# Patient Record
Sex: FEMALE | Race: WHITE | NOT HISPANIC OR LATINO | Employment: FULL TIME | URBAN - METROPOLITAN AREA
[De-identification: names, ages, dates, MRNs, and addresses within clinical notes are randomized per-mention and may not be internally consistent; named-entity substitution may affect disease eponyms.]

---

## 2017-01-16 ENCOUNTER — TELEPHONE (OUTPATIENT)
Dept: MEDICAL GROUP | Age: 27
End: 2017-01-16

## 2017-01-16 DIAGNOSIS — Z23 NEED FOR VACCINATION: ICD-10-CM

## 2017-01-16 NOTE — TELEPHONE ENCOUNTER
Patient is on the MA Schedule 01/19/2017 for TDAP, HPV, HEP A vaccine/injection.    SPECIFIC Action To Be Taken: Orders pending, please sign.

## 2017-01-19 ENCOUNTER — TELEPHONE (OUTPATIENT)
Dept: MEDICAL GROUP | Age: 27
End: 2017-01-19

## 2017-01-19 ENCOUNTER — NON-PROVIDER VISIT (OUTPATIENT)
Dept: MEDICAL GROUP | Age: 27
End: 2017-01-19
Payer: COMMERCIAL

## 2017-01-19 DIAGNOSIS — F41.9 ANXIETY: ICD-10-CM

## 2017-01-19 DIAGNOSIS — Z23 NEED FOR VACCINATION: ICD-10-CM

## 2017-01-19 PROCEDURE — 90632 HEPA VACCINE ADULT IM: CPT | Performed by: INTERNAL MEDICINE

## 2017-01-19 PROCEDURE — 90715 TDAP VACCINE 7 YRS/> IM: CPT | Performed by: INTERNAL MEDICINE

## 2017-01-19 PROCEDURE — 90651 9VHPV VACCINE 2/3 DOSE IM: CPT | Performed by: INTERNAL MEDICINE

## 2017-01-19 PROCEDURE — 90472 IMMUNIZATION ADMIN EACH ADD: CPT | Performed by: INTERNAL MEDICINE

## 2017-01-19 PROCEDURE — 90471 IMMUNIZATION ADMIN: CPT | Performed by: INTERNAL MEDICINE

## 2017-01-19 NOTE — TELEPHONE ENCOUNTER
1. Caller Name: P                                         Call Back Number: [phone] (home)       Patient approves a detailed voicemail message: no    2. SPECIFIC Action To Be Taken: Referral pending, please sign.    3. Diagnosis/Clinical Reason for Request: Patient would not state    4. Specialty & Provider Name/Lab/Imaging Location: [facility] outside of [facility]    5. Is appointment scheduled for requested order/referral: no    Patient informed they will receive a return phone call from the office ONLY if there are any questions before processing their request. Advised to call back if they haven't received a call from the referral department in 5 days.

## 2017-01-19 NOTE — PROGRESS NOTES
"Williams Singletary is a 26 y.o. female here for a non-provider visit for:   HEPATITIS A 1 of 2    Reason for immunization: continue or complete series started at the office  Immunization records indicate need for vaccine: Yes  Minimum interval has been met for this vaccine: Yes  ABN completed: Not Indicated    VIS Dated  07/20/2016 was given to patient Yes  All IAC Questionnaire questions were answered “No.\"    Patient tolerated injection and no adverse effects were observed or reported Yes.    Pt scheduled for next dose in series: Vandana Singletary is a 26 y.o. female here for a non-provider visit for:   HPV 1 of 3    Reason for immunization: continue or complete series started at the office  Immunization records indicate need for vaccine: Yes  Minimum interval has been met for this vaccine: No  ABN completed: Not Indicated    VIS Dated  12/02/2016 was given to patient Yes  All IAC Questionnaire questions were answered “No.\"    Patient tolerated injection and no adverse effects were observed or reported Yes.    Pt scheduled for next dose in series: Vandana Singletary is a 26 y.o. female here for a non-provider visit for:   TDAP    Reason for immunization: needed for work/school  Immunization records indicate need for vaccine: Yes  Minimum interval has been met for this vaccine: Yes  ABN completed: Not Indicated    VIS Dated  2/24/2015 was given to patient Yes  All IAC Questionnaire questions were answered “No.\"    Patient tolerated injection and no adverse effects were observed or reported Yes.    Pt scheduled for next dose in series: Not Indicated                  "

## 2017-01-19 NOTE — MR AVS SNAPSHOT
Williams Singletary   2017 11:30 AM   Non-Provider Visit   MRN: [id number]    Department:  [address] [place] [address] [facility]   Dept Phone:  [phone]    Description:  Female : 1990   Provider:  RADU COBOS MA           Reason for Visit     Immunizations Hep A, HPV, and Tdap      Allergies as of 2017     No Known Allergies      You were diagnosed with     Need for vaccination   [383178]         Vital Signs     Smoking Status                   Never Smoker            Basic Information     Date Of Birth Sex Race Ethnicity Preferred Language    1990 Female Unknown Unknown English      Problem List              ICD-10-CM Priority Class Noted - Resolved    Atypical chest pain R07.89   2016 - Present    Sensation of fullness in both ears H93.8X3   2016 - Present    Seasonal allergies J30.2   2016 - Present    Decreased hearing of both ears H91.93   2016 - Present    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   2016 - Present      Health Maintenance        Date Due Completion Dates    IMM HEP A VACCINE (1 of 2 - Standard Series) 1992 ---    IMM HPV VACCINE (1 of 3 - Female 3 Dose Series) 2002 ---    IMM VARICELLA (CHICKENPOX) VACCINE (1 of 2 - 2 Dose Adolescent Series) 2004 ---    IMM DTaP/Tdap/Td Vaccine (7 - Td) 2016, 1996, 1993, 1991, 1991, 1991    PAP SMEAR 2019            Current Immunizations     Dtap Vaccine 1996, 1993, 1991, 1991, 1991    HIB Vaccine(PEDVAX) 1993, 1992, 1991, 1991    HPV 9-VALENT VACCINE (GARDASIL 9) 2017 11:47 AM    Hepatitis A Vaccine, Adult 2017 11:44 AM    Hepatitis B Vaccine Non-Recombivax (Ped/Adol) 2004, 2004, 10/16/2003    Influenza TIV (IM) 2016    Influenza Vaccine Quad Inj (Pf) 2016  7:52 AM    Influenza Vaccine Quad Inj (Preserved) 2016    MMR Vaccine 1997, 1993    Meningococcal Conjugate Vaccine MCV4 (Menveo)  8/9/2006    OPV - Historical Data 6/1/1993, 1991, 1991, 1991    QF GOLD 1/20/2016    QUANTIFERON GOLD 1/20/2016    Tdap Vaccine 1/19/2017 11:46 AM, 8/9/2006      Below and/or attached are the medications your provider expects you to take. Review all of your home medications and newly ordered medications with your provider and/or pharmacist. Follow medication instructions as directed by your provider and/or pharmacist. Please keep your medication list with you and share with your provider. Update the information when medications are discontinued, doses are changed, or new medications (including over-the-counter products) are added; and carry medication information at all times in the event of emergency situations     Allergies:  No Known Allergies          Medications  Valid as of: January 19, 2017 -  1:22 PM    Generic Name Brand Name Tablet Size Instructions for use    Acyclovir (Cream) ZOVIRAX 5 %         Aluminum Chloride (Crystals) Aluminum Chloride Hexahydrate  Please provide Aluminum chloride deodorant 1 bottle. Apply on affected area once a day to twice a day as needed.        Fluticasone Propionate (Suspension) FLONASE 50 MCG/ACT Spray 2 Sprays in nose every day.        Hydrocortisone (Cream) hydrocortisone 2.5 % Apply once or twice a day as needed on affected areas.        Polyethyl Glycol-Propyl Glycol   by Ophthalmic route 2 times a day as needed.        .                 Medicines prescribed today were sent to:     [facility] #[id number] - [place] [address]    Phone: [phone] Fax: [phone]    Open 24 Hours?: No      Medication refill instructions:       If your prescription bottle indicates you have medication refills left, it is not necessary to call your provider’s office. Please contact your pharmacy and they will refill your medication.    If your prescription bottle indicates you do not have any refills left, you may request refills at  any time through one of the following ways: The online Escom system (except Urgent Care), by calling your provider’s office, or by asking your pharmacy to contact your provider’s office with a refill request. Medication refills are processed only during regular business hours and may not be available until the next business day. Your provider may request additional information or to have a follow-up visit with you prior to refilling your medication.   *Please Note: Medication refills are assigned a new Rx number when refilled electronically. Your pharmacy may indicate that no refills were authorized even though a new prescription for the same medication is available at the pharmacy. Please request the medicine by name with the pharmacy before contacting your provider for a refill.           Escom Access Code: Activation code not generated  Current Escom Status: Active

## 2017-05-03 ENCOUNTER — TELEPHONE (OUTPATIENT)
Dept: MEDICAL GROUP | Age: 27
End: 2017-05-03

## 2017-05-03 NOTE — TELEPHONE ENCOUNTER
ESTABLISHED PATIENT PRE-VISIT PLANNING     Note: Patient will not be contacted if there is no indication to call.     1.  Reviewed note from last office visit with PCP and/or other med group provider: Yes    2.  If any orders were placed at last visit, do we have Results/Consult Notes?        •  Labs - Labs were not ordered at last office visit.       •  Imaging - Imaging was not ordered at last office visit.       •  Referrals - No referrals were ordered at last office visit.    3.  Immunizations were updated in Epic using WebIZ?: Epic matches WebIZ       •  Web Iz Recommendations: HPV    4.  Patient is due for the following Health Maintenance Topics:   Health Maintenance Due   Topic Date Due   • IMM VARICELLA (CHICKENPOX) VACCINE (1 of 2 - 2 Dose Adolescent Series) 04/22/2004   • IMM HPV VACCINE (2 of 3 - Female 3 Dose Series) 03/16/2017       - Patient is up-to-date on all Health Maintenance topics. No records have been requested at this time.    5.  Patient was not informed to arrive 15 min prior to their scheduled appointment and bring in their medication bottles.

## 2017-05-04 ENCOUNTER — OFFICE VISIT (OUTPATIENT)
Dept: MEDICAL GROUP | Age: 27
End: 2017-05-04
Payer: COMMERCIAL

## 2017-05-04 VITALS
HEART RATE: 95 BPM | WEIGHT: 163 LBS | OXYGEN SATURATION: 98 % | TEMPERATURE: 98.1 F | RESPIRATION RATE: 16 BRPM | BODY MASS INDEX: 28.88 KG/M2 | SYSTOLIC BLOOD PRESSURE: 122 MMHG | HEIGHT: 63 IN | DIASTOLIC BLOOD PRESSURE: 72 MMHG

## 2017-05-04 DIAGNOSIS — J00 ACUTE NASOPHARYNGITIS: ICD-10-CM

## 2017-05-04 PROCEDURE — 99214 OFFICE O/P EST MOD 30 MIN: CPT | Performed by: INTERNAL MEDICINE

## 2017-05-04 RX ORDER — FLUTICASONE PROPIONATE 50 MCG
2 SPRAY, SUSPENSION (ML) NASAL DAILY
Qty: 16 G | Refills: 3 | Status: SHIPPED | OUTPATIENT
Start: 2017-05-04 | End: 2017-11-02

## 2017-05-04 RX ORDER — AZITHROMYCIN 250 MG/1
TABLET, FILM COATED ORAL
Qty: 6 TAB | Refills: 0 | Status: SHIPPED | OUTPATIENT
Start: 2017-05-04 | End: 2017-10-12

## 2017-05-04 ASSESSMENT — PATIENT HEALTH QUESTIONNAIRE - PHQ9: CLINICAL INTERPRETATION OF PHQ2 SCORE: 0

## 2017-05-04 ASSESSMENT — PAIN SCALES - GENERAL: PAINLEVEL: NO PAIN

## 2017-05-04 NOTE — MR AVS SNAPSHOT
"Williams Singletary   2017 1:00 PM   Office Visit   MRN: [id number]    Department:  [address] [place] [address]   Dept Phone:  [phone]    Description:  Female : 1990   Provider:  Nancy Adhikari M.D.           Reason for Visit     Sinusitis Dx 1 month ago, still having symptoms      Allergies as of 2017     No Known Allergies      You were diagnosed with     Acute nasopharyngitis   [[id number]]         Vital Signs     Blood Pressure Pulse Temperature Respirations Height Weight    122/72 mmHg 95 36.7 °C (98.1 °F) 16 1.608 m (5' 3.31\") 73.936 kg (163 lb)    Body Mass Index Oxygen Saturation Last Menstrual Period Breastfeeding? 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   2016 - Present    Acute nasopharyngitis J00   2017 - Present      Health Maintenance        Date Due Completion Dates    IMM VARICELLA (CHICKENPOX) VACCINE (1 of 2 - 2 Dose Adolescent Series) 2004 ---    IMM HPV VACCINE (2 of 3 - Female 3 Dose Series) 3/16/2017 2017    IMM HEP A VACCINE (2 of 2 - Standard Series) 2017    PAP SMEAR 2019    IMM DTaP/Tdap/Td Vaccine (8 - Td) 2027, 2006, 1996, 1993, 1991, 1991, 1991            Current Immunizations     Dtap Vaccine 1996, 1993, 1991, 1991, 1991    HIB Vaccine(PEDVAX) 1993, 1992, 1991, 1991    HPV 9-VALENT VACCINE (GARDASIL 9) 2017 11:47 AM    Hepatitis A Vaccine, Adult 2017 11:44 " AM    Hepatitis B Vaccine Non-Recombivax (Ped/Adol) 4/21/2004, 1/20/2004, 10/16/2003    Influenza TIV (IM) 1/20/2016    Influenza Vaccine Quad Inj (Pf) 11/8/2016  7:52 AM    Influenza Vaccine Quad Inj (Preserved) 1/20/2016    MMR Vaccine 4/8/1997, 6/1/1993    Meningococcal Conjugate Vaccine MCV4 (Menveo) 8/9/2006    OPV - Historical Data 8/26/1996, 6/1/1993, 1991, 1991, 1991    QF GOLD 1/20/2016    QUANTIFERON GOLD 1/20/2016    Tdap Vaccine 1/19/2017 11:46 AM, 8/9/2006      Below and/or attached are the medications your provider expects you to take. Review all of your home medications and newly ordered medications with your provider and/or pharmacist. Follow medication instructions as directed by your provider and/or pharmacist. Please keep your medication list with you and share with your provider. Update the information when medications are discontinued, doses are changed, or new medications (including over-the-counter products) are added; and carry medication information at all times in the event of emergency situations     Allergies:  No Known Allergies          Medications  Valid as of: May 04, 2017 -  4:30 PM    Generic Name Brand Name Tablet Size Instructions for use    Acyclovir (Cream) ZOVIRAX 5 %         Aluminum Chloride (Crystals) Aluminum Chloride Hexahydrate  Please provide Aluminum chloride deodorant 1 bottle. Apply on affected area once a day to twice a day as needed.        Azithromycin (Tab) ZITHROMAX 250 MG Take 2 tabs on first day and then take 1 tab daily for another 4 days.        Fluticasone Propionate (Suspension) FLONASE 50 MCG/ACT Spray 2 Sprays in nose every day.        Hydrocortisone (Cream) hydrocortisone 2.5 % Apply once or twice a day as needed on affected areas.        Polyethyl Glycol-Propyl Glycol   by Ophthalmic route 2 times a day as needed.        .                 Medicines prescribed today were sent to:     [facility][id number] - [place], [place] - [address]     [place] [address]    Phone: [phone] Fax: [phone]    Open 24 Hours?: No      Medication refill instructions:       If your prescription bottle indicates you have medication refills left, it is not necessary to call your provider’s office. Please contact your pharmacy and they will refill your medication.    If your prescription bottle indicates you do not have any refills left, you may request refills at any time through one of the following ways: The online 404 Found! system (except Urgent Care), by calling your provider’s office, or by asking your pharmacy to contact your provider’s office with a refill request. Medication refills are processed only during regular business hours and may not be available until the next business day. Your provider may request additional information or to have a follow-up visit with you prior to refilling your medication.   *Please Note: Medication refills are assigned a new Rx number when refilled electronically. Your pharmacy may indicate that no refills were authorized even though a new prescription for the same medication is available at the pharmacy. Please request the medicine by name with the pharmacy before contacting your provider for a refill.           404 Found! Access Code: Activation code not generated  Current 404 Found! Status: Active

## 2017-05-04 NOTE — ASSESSMENT & PLAN NOTE
This is a new problem to me. Patient reported that she has runny nose, postnasal drip, cough of yellowish phlegm for 3-4 weeks. She tried over-the-counter Mucinex without significant improvement. She reported fullness on the ears but no discharge or pain. She has [place] pot at home. She has not tried it yet. She reported subjective feverish at home, but she did not measure her temperature. She is afebrile in the clinic today. She had flu shot in November 2016.

## 2017-05-04 NOTE — PROGRESS NOTES
"Subjective:   Williams Singletary is a 27 y.o. female here today for evaluation and management of:      Acute nasopharyngitis  This is a new problem to me. Patient reported that she has runny nose, postnasal drip, cough of yellowish phlegm for 3-4 weeks. She tried over-the-counter Mucinex without significant improvement. She reported fullness on the ears but no discharge or pain. She has Sera pot at home. She has not tried it yet. She reported subjective feverish at home, but she did not measure her temperature. She is afebrile in the clinic today. She had flu shot in November 2016.         Current medicines (including changes today)  Current Outpatient Prescriptions   Medication Sig Dispense Refill   • azithromycin (ZITHROMAX) 250 MG Tab Take 2 tabs on first day and then take 1 tab daily for another 4 days. 6 Tab 0   • fluticasone (FLONASE) 50 MCG/ACT nasal spray Spray 2 Sprays in nose every day. 16 g 3   • hydrocortisone 2.5 % Cream topical cream Apply once or twice a day as needed on affected areas. 1 Tube 1   • Aluminum Chloride Hexahydrate Crystals Please provide Aluminum chloride deodorant 1 bottle. Apply on affected area once a day to twice a day as needed. 1 Bottle 0   • Polyethyl Glycol-Propyl Glycol (SYSTANE ULTRA OP) by Ophthalmic route 2 times a day as needed.     • ZOVIRAX 5 % Cream        No current facility-administered medications for this visit.     She  has a past medical history of H/O bladder infections.    ROS   No chest pain, no shortness of breath, no abdominal pain       Objective:     Blood pressure 122/72, pulse 95, temperature 36.7 °C (98.1 °F), resp. rate 16, height 1.608 m (5' 3.31\"), weight 73.936 kg (163 lb), last menstrual period 04/17/2017, SpO2 98 %, not currently breastfeeding. Body mass index is 28.59 kg/(m^2).   Physical Exam:  General: Alert, oriented and no acute distress.  Eye contact is good, speech goal directed, affect calm  HEENT: conjunctiva non-injected, sclera non-icteric. "   Oral mucous membranes pink and moist with no lesions.  Pinna normal. TM pearly gray. Mild serous effusion on left ear, mild erythema on right ear canal.  Neck No supraclavicular, submandibular, submental lymphadenopathy or masses in the neck or supraclavicular regions.  Lungs: Normal respiratory effort, clear to auscultation bilaterally with good excursion.  CV: regular rate and rhythm. No murmurs.   Abdomen: soft, non distended, nontender, Bowel sound normal.  Ext: no edema, color normal, vascularity normal, temperature normal        Assessment and Plan:   The following treatment plan was discussed     1. Acute nasopharyngitis  - Prescribed Z-Pernell and reviewed. They will use and side effect of Z-Pernell with patient. Advised to take probiotic with Z-Pernell.  - Discussed at length to rinse sinuses with over the counter nasal wash or Netti pot once or twice a day and then use flonase nasal spray once or twice a day after rinsing flonase nasal spray.  - Advised to try nasal steam therapy. Showed picture and discussed with patient how to do nasal steam therapy.   - Advised to gargle her throat with warm salt water.    - Recommend to increase water intake.  - azithromycin (ZITHROMAX) 250 MG Tab; Take 2 tabs on first day and then take 1 tab daily for another 4 days.  Dispense: 6 Tab; Refill: 0  - fluticasone (FLONASE) 50 MCG/ACT nasal spray; Spray 2 Sprays in nose every day.  Dispense: 16 g; Refill: 3      Followup: Return if symptoms worsen or fail to improve.      Please note that this dictation was created using voice recognition software. I have made every reasonable attempt to correct obvious errors, but I expect that there may have unintended errors in text, spelling, punctuation, or grammar that I did not discover.

## 2017-07-27 ENCOUNTER — HOSPITAL ENCOUNTER (OUTPATIENT)
Dept: LAB | Facility: MEDICAL CENTER | Age: 27
End: 2017-07-27
Payer: COMMERCIAL

## 2017-07-27 LAB
BDY FAT % MEASURED: 26.5 %
BP DIAS: 63 MMHG
BP SYS: 111 MMHG
CHOLEST SERPL-MCNC: 148 MG/DL (ref 100–199)
DIABETES HTDIA: NO
EVENT NAME HTEVT: NORMAL
FASTING HTFAS: YES
GLUCOSE SERPL-MCNC: 82 MG/DL (ref 65–99)
HDLC SERPL-MCNC: 56 MG/DL
HYPERTENSION HTHYP: NO
LDLC SERPL CALC-MCNC: 80 MG/DL
SCREENING LOC CITY HTCIT: NORMAL
SCREENING LOC STATE HTSTA: NORMAL
SCREENING LOCATION HTLOC: NORMAL
SMOKING HTSMO: NO
SUBSCRIBER ID HTSID: NORMAL
TRIGL SERPL-MCNC: 58 MG/DL (ref 0–149)

## 2017-07-27 PROCEDURE — 36415 COLL VENOUS BLD VENIPUNCTURE: CPT

## 2017-07-27 PROCEDURE — S5190 WELLNESS ASSESSMENT BY NONPH: HCPCS

## 2017-07-27 PROCEDURE — 82947 ASSAY GLUCOSE BLOOD QUANT: CPT

## 2017-07-27 PROCEDURE — 80061 LIPID PANEL: CPT

## 2017-09-12 ENCOUNTER — TELEPHONE (OUTPATIENT)
Dept: URGENT CARE | Facility: CLINIC | Age: 27
End: 2017-09-12

## 2017-09-15 ENCOUNTER — EH NON-PROVIDER (OUTPATIENT)
Dept: OCCUPATIONAL MEDICINE | Facility: CLINIC | Age: 27
End: 2017-09-15

## 2017-09-15 DIAGNOSIS — Z29.89 NEED FOR ISOLATION: ICD-10-CM

## 2017-09-15 PROCEDURE — 94375 RESPIRATORY FLOW VOLUME LOOP: CPT

## 2017-10-04 ENCOUNTER — IMMUNIZATION (OUTPATIENT)
Dept: OCCUPATIONAL MEDICINE | Facility: CLINIC | Age: 27
End: 2017-10-04

## 2017-10-04 DIAGNOSIS — Z23 NEED FOR VACCINATION: ICD-10-CM

## 2017-10-04 PROCEDURE — 90686 IIV4 VACC NO PRSV 0.5 ML IM: CPT | Performed by: PREVENTIVE MEDICINE

## 2017-10-11 ENCOUNTER — TELEPHONE (OUTPATIENT)
Dept: MEDICAL GROUP | Age: 27
End: 2017-10-11

## 2017-10-11 NOTE — TELEPHONE ENCOUNTER
ESTABLISHED PATIENT PRE-VISIT PLANNING     Note: Patient will not be contacted if there is no indication to call.     1.  Reviewed notes from the last few office visits within the medical group: Yes    2.  If any orders were placed at last visit or intended to be done for this visit (i.e. 6 mos follow-up), do we have Results/Consult Notes?        •  Labs - Labs were not ordered at last office visit.   Note: If patient appointment is for lab review and patient did not complete labs,                check with provider if OK to reschedule patient until labs completed.       •  Imaging - Imaging was not ordered at last office visit.       •  Referrals - Referral ordered, patient has NOT been seen.    3. Is this appointment scheduled as a Hospital Follow-Up? No    4.  Immunizations were updated in Epic using WebIZ?: Epic matches WebIZ       •  Web Iz Recommendations: HEPATITIS A , HPV and VARICELLA (Chicken Pox)     5.  Patient is due for the following Health Maintenance Topics:   Health Maintenance Due   Topic Date Due   • IMM VARICELLA (CHICKENPOX) VACCINE (1 of 2 - 2 Dose Adolescent Series) 04/22/2004   • IMM HPV VACCINE (2 of 3 - Female 3 Dose Series) 03/16/2017   • IMM HEP A VACCINE (2 of 2 - Standard Series) 07/19/2017       - Patient is up-to-date on all Health Maintenance topics. No records have been requested at this time.    6.  Patient was NOT informed to arrive 15 min prior to their scheduled appointment and bring in their medication bottles.

## 2017-10-12 ENCOUNTER — OFFICE VISIT (OUTPATIENT)
Dept: MEDICAL GROUP | Age: 27
End: 2017-10-12
Payer: COMMERCIAL

## 2017-10-12 ENCOUNTER — HOSPITAL ENCOUNTER (OUTPATIENT)
Facility: MEDICAL CENTER | Age: 27
End: 2017-10-12
Attending: FAMILY MEDICINE
Payer: COMMERCIAL

## 2017-10-12 VITALS
TEMPERATURE: 98.3 F | HEIGHT: 65 IN | BODY MASS INDEX: 27.76 KG/M2 | HEART RATE: 83 BPM | OXYGEN SATURATION: 99 % | WEIGHT: 166.6 LBS | SYSTOLIC BLOOD PRESSURE: 118 MMHG | DIASTOLIC BLOOD PRESSURE: 70 MMHG

## 2017-10-12 DIAGNOSIS — Z23 NEED FOR HPV VACCINATION: ICD-10-CM

## 2017-10-12 DIAGNOSIS — M54.17 LUMBOSACRAL RADICULOPATHY: ICD-10-CM

## 2017-10-12 DIAGNOSIS — N30.00 ACUTE CYSTITIS WITHOUT HEMATURIA: ICD-10-CM

## 2017-10-12 PROCEDURE — 81001 URINALYSIS AUTO W/SCOPE: CPT

## 2017-10-12 PROCEDURE — 87077 CULTURE AEROBIC IDENTIFY: CPT

## 2017-10-12 PROCEDURE — 87086 URINE CULTURE/COLONY COUNT: CPT

## 2017-10-12 PROCEDURE — 90471 IMMUNIZATION ADMIN: CPT | Performed by: FAMILY MEDICINE

## 2017-10-12 PROCEDURE — 99214 OFFICE O/P EST MOD 30 MIN: CPT | Mod: 25 | Performed by: FAMILY MEDICINE

## 2017-10-12 PROCEDURE — 99000 SPECIMEN HANDLING OFFICE-LAB: CPT | Performed by: FAMILY MEDICINE

## 2017-10-12 PROCEDURE — 87186 SC STD MICRODIL/AGAR DIL: CPT

## 2017-10-12 PROCEDURE — 90651 9VHPV VACCINE 2/3 DOSE IM: CPT | Performed by: FAMILY MEDICINE

## 2017-10-12 RX ORDER — NITROFURANTOIN 25; 75 MG/1; MG/1
100 CAPSULE ORAL EVERY 12 HOURS
Qty: 10 CAP | Refills: 0 | Status: SHIPPED | OUTPATIENT
Start: 2017-10-12 | End: 2017-10-17

## 2017-10-12 RX ORDER — NAPROXEN 500 MG/1
500 TABLET ORAL 2 TIMES DAILY WITH MEALS
Qty: 30 TAB | Refills: 0 | Status: SHIPPED | OUTPATIENT
Start: 2017-10-12 | End: 2017-11-02

## 2017-10-12 RX ORDER — CYCLOBENZAPRINE HCL 10 MG
10 TABLET ORAL 3 TIMES DAILY PRN
Qty: 30 TAB | Refills: 0 | Status: SHIPPED | OUTPATIENT
Start: 2017-10-12 | End: 2017-11-02

## 2017-10-13 LAB
APPEARANCE UR: ABNORMAL
BACTERIA #/AREA URNS HPF: ABNORMAL /HPF
BILIRUB UR QL STRIP.AUTO: NEGATIVE
COLOR UR: ABNORMAL
CULTURE IF INDICATED INDCX: YES UA CULTURE
EPI CELLS #/AREA URNS HPF: ABNORMAL /HPF
GLUCOSE UR STRIP.AUTO-MCNC: NEGATIVE MG/DL
KETONES UR STRIP.AUTO-MCNC: ABNORMAL MG/DL
LEUKOCYTE ESTERASE UR QL STRIP.AUTO: ABNORMAL
MICRO URNS: ABNORMAL
NITRITE UR QL STRIP.AUTO: NEGATIVE
PH UR STRIP.AUTO: 5.5 [PH]
PROT UR QL STRIP: NEGATIVE MG/DL
RBC # URNS HPF: ABNORMAL /HPF
RBC UR QL AUTO: NEGATIVE
SP GR UR STRIP.AUTO: 1.02
UROBILINOGEN UR STRIP.AUTO-MCNC: 1 MG/DL
WBC #/AREA URNS HPF: ABNORMAL /HPF

## 2017-10-13 NOTE — PROGRESS NOTES
Subjective:   CC: acute back pain    HPI:     Williams Singletary is a 27 y.o. female, established patient of the clinic, presents with the following concerns:     1. Lumbosacral radiculopathy  Pt states that she suffers acute lumbosacral back pain after sleeping on her parent's love seat couple days ago. Pain is sharp and spatic, mostly localizes to the right lumbosacral area and radiate to the posterior and lateral right thigh. Pain is worse with direct pressure and with truncal flexing and bending, better with rest/heat/and topical analgesic cream. Yesterday, she was trying to catch an object and suffers worsening pain. She denies fever, chills, urinary/bowel symptoms, denies LE weakness.     2. Acute cystitis without hematuria  Pt c/o intermittent dysuria over the past few days, symptoms improving temporarily with AZO and hydration but keeps coming back. Associated symptoms include urinary frequency and urgency.   She denies fever, chills, flank pain, hematuria, pelvic pain/pressure. She is not on her period. She is sexually active, negative hx of STD.     Current medicines (including changes today)  Current Outpatient Prescriptions   Medication Sig Dispense Refill   • nitrofurantoin monohydr macro (MACROBID) 100 MG Cap Take 1 Cap by mouth every 12 hours for 5 days. 10 Cap 0   • cyclobenzaprine (FLEXERIL) 10 MG Tab Take 1 Tab by mouth 3 times a day as needed. 30 Tab 0   • naproxen (NAPROSYN) 500 MG Tab Take 1 Tab by mouth 2 times a day, with meals. 30 Tab 0   • fluticasone (FLONASE) 50 MCG/ACT nasal spray Spray 2 Sprays in nose every day. 16 g 3   • hydrocortisone 2.5 % Cream topical cream Apply once or twice a day as needed on affected areas. 1 Tube 1   • Aluminum Chloride Hexahydrate Crystals Please provide Aluminum chloride deodorant 1 bottle. Apply on affected area once a day to twice a day as needed. 1 Bottle 0   • Polyethyl Glycol-Propyl Glycol (SYSTANE ULTRA OP) by Ophthalmic route 2 times a day as needed.     •  "ZOVIRAX 5 % Cream        No current facility-administered medications for this visit.      She  has a past medical history of H/O bladder infections.    I personally reviewed patient's problem list, allergies, medications, family hx, social hx with patient and update EPIC.     REVIEW OF SYSTEMS:  CONSTITUTIONAL:  Denies night sweats, fatigue, malaise, lethargy, fever or chills.  RESPIRATORY:  Denies cough, wheeze, hemoptysis, or shortness of breath.  CARDIOVASCULAR:  Denies chest pains, palpitations, pedal edema     Objective:     Blood pressure 118/70, pulse 83, temperature 36.8 °C (98.3 °F), height 1.651 m (5' 5\"), weight 75.6 kg (166 lb 9.6 oz), SpO2 99 %. Body mass index is 27.72 kg/m².    Physical Exam:  Constitutional: awake, alert, in no distress.  Skin: Warm, dry, good turgor, no rashes, bruises, ulcers in visible areas.  Eye: conjunctiva clear, lids neg for edema or lesions.  Respiratory: Unlabored respiratory effort, lungs clear to auscultation, no wheezes, no rhonchi.  Cardiovascular: Normal S1, S2, no murmur, no pedal edema.  Abdomen: Soft, non-tender to palpation, no hernia, no hepatosplenomegaly, negative flank pain to percussion.   Neuro: CN2-12 grossly intact.    Psych: Oriented x3, affect and mood wnl, intact judgement and insight.   MSK: no visible deformity of the spine, lumbosacral ROM mildly limited due to pain, right lumbosacral paraspinal muscles are moderately tender to palpation, positive straight leg raise bilaterally. Strength 5/5, reflexes 2/4.     Assessment and Plan:   The following treatment plan was discussed    1. Lumbosacral radiculopathy  Hx and exam concerning for acute lumbosacral radiculopathy, no red flags. Plan:   - cyclobenzaprine (FLEXERIL) 10 MG Tab; Take 1 Tab by mouth 3 times a day as needed.  Dispense: 30 Tab; Refill: 0  - naproxen (NAPROSYN) 500 MG Tab; Take 1 Tab by mouth 2 times a day, with meals.  Dispense: 30 Tab; Refill: 0  - home rehab exercise  - f/u with PCP " PRN.     2. Acute cystitis without hematuria  Hx concerning for acute UTI. Pt is not reliable as pt is taking Azo. Plan:   - Urinalysis, Culture if Indicated; Future  - nitrofurantoin monohydr macro (MACROBID) 100 MG Cap; Take 1 Cap by mouth every 12 hours for 5 days.  Dispense: 10 Cap; Refill: 0  - hydration, cranberry juice  - f/u with PCP PRN.     3. Need for HPV vaccination  - Gardasil 9      Ly RODNEY Ash M.D.      Followup: Return for As needed.    Please note that this dictation was created using voice recognition software. I have made every reasonable attempt to correct obvious errors, but I expect that there are errors of grammar and possibly content that I did not discover before finalizing the note.

## 2017-10-15 LAB
BACTERIA UR CULT: ABNORMAL
SIGNIFICANT IND 70042: ABNORMAL
SOURCE SOURCE: ABNORMAL

## 2017-11-02 ENCOUNTER — OFFICE VISIT (OUTPATIENT)
Dept: MEDICAL GROUP | Age: 27
End: 2017-11-02
Payer: COMMERCIAL

## 2017-11-02 ENCOUNTER — HOSPITAL ENCOUNTER (OUTPATIENT)
Dept: RADIOLOGY | Facility: MEDICAL CENTER | Age: 27
End: 2017-11-02
Attending: INTERNAL MEDICINE
Payer: COMMERCIAL

## 2017-11-02 VITALS
RESPIRATION RATE: 16 BRPM | DIASTOLIC BLOOD PRESSURE: 82 MMHG | HEART RATE: 64 BPM | WEIGHT: 154 LBS | SYSTOLIC BLOOD PRESSURE: 134 MMHG | HEIGHT: 65 IN | BODY MASS INDEX: 25.66 KG/M2 | OXYGEN SATURATION: 99 % | TEMPERATURE: 97.3 F

## 2017-11-02 DIAGNOSIS — R11.0 NAUSEA: ICD-10-CM

## 2017-11-02 DIAGNOSIS — R05.9 COUGH: Primary | ICD-10-CM

## 2017-11-02 DIAGNOSIS — J18.9 COMMUNITY ACQUIRED PNEUMONIA OF LEFT LOWER LOBE OF LUNG: ICD-10-CM

## 2017-11-02 DIAGNOSIS — R05.9 COUGH: ICD-10-CM

## 2017-11-02 DIAGNOSIS — J20.9 ACUTE BRONCHITIS, UNSPECIFIED ORGANISM: ICD-10-CM

## 2017-11-02 PROBLEM — J00 ACUTE NASOPHARYNGITIS: Status: RESOLVED | Noted: 2017-05-04 | Resolved: 2017-11-02

## 2017-11-02 LAB
FLUAV+FLUBV AG SPEC QL IA: NORMAL
INT CON NEG: NORMAL
INT CON POS: NORMAL

## 2017-11-02 PROCEDURE — 87804 INFLUENZA ASSAY W/OPTIC: CPT | Performed by: INTERNAL MEDICINE

## 2017-11-02 PROCEDURE — 71020 DX-CHEST-2 VIEWS: CPT

## 2017-11-02 PROCEDURE — 99214 OFFICE O/P EST MOD 30 MIN: CPT | Performed by: INTERNAL MEDICINE

## 2017-11-02 RX ORDER — AMOXICILLIN AND CLAVULANATE POTASSIUM 875; 125 MG/1; MG/1
1 TABLET, FILM COATED ORAL 2 TIMES DAILY
Qty: 30 TAB | Refills: 1 | Status: SHIPPED | OUTPATIENT
Start: 2017-11-02 | End: 2017-11-06

## 2017-11-02 RX ORDER — PROMETHAZINE HYDROCHLORIDE 25 MG/1
25 TABLET ORAL EVERY 6 HOURS PRN
Qty: 20 TAB | Refills: 0 | Status: SHIPPED | OUTPATIENT
Start: 2017-11-02 | End: 2017-12-14

## 2017-11-02 RX ORDER — CEFTRIAXONE 1 G/1
1 INJECTION, POWDER, FOR SOLUTION INTRAMUSCULAR; INTRAVENOUS ONCE
Status: COMPLETED | OUTPATIENT
Start: 2017-11-03 | End: 2017-11-03

## 2017-11-02 ASSESSMENT — ENCOUNTER SYMPTOMS
GASTROINTESTINAL NEGATIVE: 1
CARDIOVASCULAR NEGATIVE: 1
CONSTITUTIONAL NEGATIVE: 1
EYES NEGATIVE: 1
NEUROLOGICAL NEGATIVE: 1
RESPIRATORY NEGATIVE: 1
PSYCHIATRIC NEGATIVE: 1
MUSCULOSKELETAL NEGATIVE: 1

## 2017-11-02 NOTE — PROGRESS NOTES
"Subjective:      Williams Singletary is a 27 y.o. female who presents with Cough (1 week )  The patient has a one-week history of cough productive of yellowish sputum with low-grade fever and myalgias and chills and low-grade headache. No sore throat. No abdominal pain diarrhea or constipation but she does have some mild nausea for the last few days.      Medications none     allergies none                  HPI    Review of Systems   Constitutional: Negative.    HENT: Negative.    Eyes: Negative.    Respiratory: Negative.    Cardiovascular: Negative.    Gastrointestinal: Negative.    Genitourinary: Negative.    Musculoskeletal: Negative.    Skin: Negative.    Neurological: Negative.    Endo/Heme/Allergies: Negative.    Psychiatric/Behavioral: Negative.           Objective:     /82   Pulse 64   Temp 36.3 °C (97.3 °F)   Resp 16   Ht 1.651 m (5' 5\")   Wt 69.9 kg (154 lb)   LMP 10/19/2017   SpO2 99%   Breastfeeding? No   BMI 25.63 kg/m²      Physical Exam   Constitutional: She is oriented to person, place, and time. She appears well-developed and well-nourished. No distress.   HENT:   Head: Normocephalic and atraumatic.   Right Ear: External ear normal.   Left Ear: External ear normal.   Nose: Nose normal.   Mouth/Throat: Oropharynx is clear and moist. No oropharyngeal exudate.   Eyes: Conjunctivae and EOM are normal. Pupils are equal, round, and reactive to light. Right eye exhibits no discharge. Left eye exhibits no discharge. No scleral icterus.   Neck: Normal range of motion. Neck supple. No JVD present. No tracheal deviation present. No thyromegaly present.   Cardiovascular: Normal rate, regular rhythm, normal heart sounds and intact distal pulses.  Exam reveals no gallop and no friction rub.    No murmur heard.  Pulmonary/Chest: Effort normal and breath sounds normal. No stridor. No respiratory distress. She has no wheezes. She has no rales. She exhibits no tenderness.   Abdominal: Soft. Bowel sounds are " normal. She exhibits no distension and no mass. There is no tenderness. There is no rebound and no guarding.   Musculoskeletal: Normal range of motion. She exhibits no edema or tenderness.   Lymphadenopathy:     She has no cervical adenopathy.   Neurological: She is alert and oriented to person, place, and time. She has normal reflexes. She displays normal reflexes. No cranial nerve deficit. She exhibits normal muscle tone. Coordination normal.   Skin: Skin is warm and dry. No rash noted. She is not diaphoretic. No erythema. No pallor.   Psychiatric: She has a normal mood and affect. Her behavior is normal. Judgment and thought content normal.   Vitals reviewed.    Hospital Outpatient Visit on 10/12/2017   Component Date Value   • Micro Urine Req 10/13/2017 Microscopic    • Color 10/13/2017 DK Yellow    • Character 10/13/2017 Cloudy*   • Specific Gravity 10/13/2017 1.024    • Ph 10/13/2017 5.5    • Glucose 10/13/2017 Negative    • Ketones 10/13/2017 Trace*   • Protein 10/13/2017 Negative    • Bilirubin 10/13/2017 Negative    • Urobilinogen, Urine 10/13/2017 1.0    • Nitrite 10/13/2017 Negative    • Leukocyte Esterase 10/13/2017 Trace*   • Occult Blood 10/13/2017 Negative    • Culture Indicated 10/13/2017 Yes    • WBC 10/13/2017 2-5    • RBC 10/13/2017 0-2    • Bacteria 10/13/2017 Many*   • Epithelial Cells 10/13/2017 Few    • Significant Indicator 10/15/2017 POS    • Source 10/15/2017 UR    • Urine Culture 10/15/2017 *                    Value:Klebsiella pneumoniae  >100,000 cfu/mL        No results found for: HBA1C  Lab Results   Component Value Date/Time    SODIUM 136 08/18/2016 09:38 AM    POTASSIUM 3.8 08/18/2016 09:38 AM    CHLORIDE 108 08/18/2016 09:38 AM    CO2 22 08/18/2016 09:38 AM    GLUCOSE 82 07/27/2017 12:57 PM    BUN 9 08/18/2016 09:38 AM    CREATININE 0.64 08/18/2016 09:38 AM    ALKPHOSPHAT 105 (H) 08/18/2016 09:38 AM    ASTSGOT 19 08/18/2016 09:38 AM    ALTSGPT 15 08/18/2016 09:38 AM    TBILIRUBIN  0.9 08/18/2016 09:38 AM     No results found for: INR  Lab Results   Component Value Date/Time    CHOLSTRLTOT 148 07/27/2017 12:57 PM    LDL 80 07/27/2017 12:57 PM    HDL 56 07/27/2017 12:57 PM    TRIGLYCERIDE 58 07/27/2017 12:57 PM       No results found for: TESTOSTERONE  No results found for: TSH  No results found for: FREET4  No results found for: URICACID  No components found for: VITB12  No results found for: 25HYDROXY            Assessment/Plan:     1. Cough-Probably secondary to an acute bronchitis. Check chest x-ray and start Augmentin      - DX-CHEST-2 VIEWS; Future-pending, call result  - POCT Influenza A/B-negative  - promethazine (PHENERGAN) 25 MG Tab; Take 1 Tab by mouth every 6 hours as needed for Nausea/Vomiting.  Dispense: 20 Tab; Refill: 0    2. Acute bronchitis, unspecified organism  Start Augmentin.- amoxicillin-clavulanate (AUGMENTIN) 875-125 MG Tab; Take 1 Tab by mouth 2 times a day.  Dispense: 30 Tab; Refill: 1    3. Nausea-probably secondary to her acute bronchitis. Phenergan when necessary which also help her cough.         - promethazine (PHENERGAN) 25 MG Tab; Take 1 Tab by mouth every 6 hours as needed for Nausea/Vomiting.  Dispense: 20 Tab; Refill: 0        30 minute face-to-face encounter took place today.  More than half of this time was spent in the coordination of care of the above problems, as well as counseling.

## 2017-11-03 ENCOUNTER — TELEPHONE (OUTPATIENT)
Dept: MEDICAL GROUP | Age: 27
End: 2017-11-03

## 2017-11-03 ENCOUNTER — NON-PROVIDER VISIT (OUTPATIENT)
Dept: MEDICAL GROUP | Age: 27
End: 2017-11-03
Payer: COMMERCIAL

## 2017-11-03 DIAGNOSIS — J18.9 COMMUNITY ACQUIRED PNEUMONIA OF LEFT LOWER LOBE OF LUNG: ICD-10-CM

## 2017-11-03 PROCEDURE — 96372 THER/PROPH/DIAG INJ SC/IM: CPT | Performed by: INTERNAL MEDICINE

## 2017-11-03 RX ADMIN — CEFTRIAXONE 1 G: 1 INJECTION, POWDER, FOR SOLUTION INTRAMUSCULAR; INTRAVENOUS at 18:21

## 2017-11-03 NOTE — TELEPHONE ENCOUNTER
----- Message from Irving Adorno M.D. sent at 11/2/2017  6:18 PM PDT -----  Tell patient she has a left lower lobe pneumonia on her chest x-ray. She should come in tomorrow for Rocephin shot- ordered. Start on oral antibiotics tonight continue for 2 weeks. If she is no better after this weekend she should come in on Monday, November 6, for another Rocephin shot at that time.

## 2017-11-03 NOTE — PROGRESS NOTES
Phone Number Called: [phone] (home)     Message: Dr. Adorno would like the pt to come in for a Rocephin tomorrow. I have left her message notifying her of this.    Left Message for patient to call back: yes

## 2017-11-03 NOTE — TELEPHONE ENCOUNTER
Phone Number Called: [phone] (home)     Message: called pt left message for pt to call back.     Left Message for patient to call back: yes

## 2017-11-04 NOTE — PROGRESS NOTES
Williams Singletary is a 27 y.o. female here for a non-provider visit for ceftriaxon injection.    Reason for injection: pneumoina  Order in MAR?: Yes  Patient supplied?:No  Minimum interval has been met for this injection (per MAR order): Yes    Order and dose verified by:   Patient tolerated injection and no adverse effects were observed or reported: Yes    # of Administrations remaining in MAR: 0

## 2017-11-06 ENCOUNTER — OFFICE VISIT (OUTPATIENT)
Dept: MEDICAL GROUP | Facility: PHYSICIAN GROUP | Age: 27
End: 2017-11-06
Payer: COMMERCIAL

## 2017-11-06 VITALS
HEIGHT: 65 IN | BODY MASS INDEX: 25.9 KG/M2 | TEMPERATURE: 98.1 F | WEIGHT: 155.42 LBS | HEART RATE: 100 BPM | OXYGEN SATURATION: 94 % | DIASTOLIC BLOOD PRESSURE: 70 MMHG | SYSTOLIC BLOOD PRESSURE: 116 MMHG

## 2017-11-06 DIAGNOSIS — J18.9 PNEUMONIA OF LEFT LOWER LOBE DUE TO INFECTIOUS ORGANISM: ICD-10-CM

## 2017-11-06 PROCEDURE — 99213 OFFICE O/P EST LOW 20 MIN: CPT | Performed by: FAMILY MEDICINE

## 2017-11-06 RX ORDER — DOXYCYCLINE HYCLATE 100 MG
100 TABLET ORAL 2 TIMES DAILY
Qty: 20 TAB | Refills: 0 | Status: SHIPPED | OUTPATIENT
Start: 2017-11-06 | End: 2017-12-14

## 2017-11-06 RX ORDER — CEFTRIAXONE SODIUM 250 MG/1
1000 INJECTION, POWDER, FOR SOLUTION INTRAMUSCULAR; INTRAVENOUS ONCE
Status: COMPLETED | OUTPATIENT
Start: 2017-11-06 | End: 2017-11-06

## 2017-11-06 RX ORDER — ONDANSETRON 4 MG/1
4 TABLET, ORALLY DISINTEGRATING ORAL EVERY 6 HOURS PRN
Qty: 30 TAB | Refills: 0 | Status: SHIPPED | OUTPATIENT
Start: 2017-11-06 | End: 2017-12-14

## 2017-11-06 RX ADMIN — CEFTRIAXONE SODIUM 1000 MG: 250 INJECTION, POWDER, FOR SOLUTION INTRAMUSCULAR; INTRAVENOUS at 13:29

## 2017-11-06 NOTE — LETTER
November 6, 2017         Patient: Williams Singletary   YOB: 1990   Date of Visit: 11/6/2017           To Whom it May Concern:    Williams Singletary was seen in my clinic on 11/6/2017. She is excused from work on 11/5-8/2017 due to illness.    If you have any questions or concerns, please don't hesitate to call.        Sincerely,           Destinee Brar M.D.  Electronically Signed

## 2017-11-06 NOTE — PROGRESS NOTES
"Subjective:      Williams Singletary is a 27 y.o. female who presents with Pneumonia (x5); Emesis (x5 to 6); and Cough            HPI     This is a 27-year-old white female patient of Dr. Nancy Adhikari. Patient was seen by Dr. Adorno on 11/2/17 and treated for acute bronchitis with Augmentin. Chest x-ray however came back left lower lobe pneumonia. She said she went back on 11/3/17 to his office and she was given Rocephin. We don't have documentation in the chart of this encounter. She started taking the Augmentin 3 days ago. She said she had is not feeling better. She is still coughing up colored phlegm. She is still having on and off fever and chills. She has been nauseated. She was given Phenergan which is not helping. She denies any vomiting. She said she is able to keep the medication down and food down.    She is otherwise healthy without any significant medical problems.    She has not been able to go to work since 11/2/17.    Past medical history, past surgical history, family history reviewed-no changes    Social history reviewed-no changes    Allergies reviewed-no changes    Medications reviewed-no changes    ROS     Review of systems as per history of present illness, the rest are negative.       Objective:     /70   Pulse 100   Temp 36.7 °C (98.1 °F)   Ht 1.651 m (5' 5\")   Wt 70.5 kg (155 lb 6.8 oz)   LMP 10/19/2017   SpO2 94%   BMI 25.86 kg/m²      Physical Exam     Examined alert, awake, oriented, not in distress    Ears-tympanic membranes intact bilaterally without evidence of infection  Neck-supple, no lymphadenopathy, no thyromegaly  Lungs-clear to auscultation, positive rales left base, no wheezes, good air exchange  Heart-regular rate and rhythm, no murmur  Extremities-no edema, clubbing, cyanosis       11/2/2017 2:40 PM    HISTORY/REASON FOR EXAM:  Cough  Vomiting    TECHNIQUE/EXAM DESCRIPTION AND NUMBER OF VIEWS:  Two views of the chest.    COMPARISON:  None " available.    FINDINGS:  Cardiomediastinal contour is within normal limits.  Ill-defined opacity in the medial LEFT lung base, partially obscuring hemidiaphragm.  No pleural fluid collection or pneumothorax.  No major bony abnormality is seen.   Impression       LEFT lower lobe pneumonia.   Reading Provider Reading Date   Rosendo Grider M.D. Nov 2, 2017   Signing Provider Signing Date Signing Time   Rosendo Grider M.D.            Assessment/Plan:     1. Pneumonia of left lower lobe due to infectious organism (CMS-[facility])  Patient is not hypoxic. She does not appear short of breath. She looks comfortable. She was given Rocephin 1 g IM today. I will change the Augmentin to doxycycline 100 mg one tablet twice daily for 10 days for better coverage. Advised increase by mouth fluids and rest. Work note was given through 11/8/17. He said he will fly to [place] to visit his family on 11/9/17. She should be able to fly by then. If she is not better in 2 days advised to follow-up. She was given chest x-ray order to be done in a month to make sure pneumonia is resolved.  - cefTRIAXone (ROCEPHIN) injection 1,000 mg; 1,000 mg by Intramuscular route Once.  - DX-CHEST-2 VIEWS; Future      Please note that this dictation was created using voice recognition software. I have worked with consultants from the vendor as well as technical experts from [facility] to optimize the interface. I have made every reasonable attempt to correct obvious errors, but I expect that there are errors of grammar and possibly content I did not discover before finalizing the note.

## 2017-11-08 NOTE — TELEPHONE ENCOUNTER
Since she is not doing any better with the Rocephin and doxycycline prescribed 2 days ago, she is to go the emergency room for further evaluation and management

## 2017-11-08 NOTE — TELEPHONE ENCOUNTER
Phone Number Called: [phone] (home)     Message: Pt states that she saw another provider and has had two rocephin shots. She is still very nauseated. Promethazine just makes her tired and Zofran is not covered by her insurance. Is there anything else that you can prescribe to help her nausea?    Left Message for patient to call back: no

## 2017-11-15 ENCOUNTER — TELEPHONE (OUTPATIENT)
Dept: MEDICAL GROUP | Facility: PHYSICIAN GROUP | Age: 27
End: 2017-11-15

## 2017-12-07 ENCOUNTER — TELEPHONE (OUTPATIENT)
Dept: MEDICAL GROUP | Facility: PHYSICIAN GROUP | Age: 27
End: 2017-12-07

## 2017-12-07 ENCOUNTER — GYNECOLOGY VISIT (OUTPATIENT)
Dept: OBGYN | Facility: MEDICAL CENTER | Age: 27
End: 2017-12-07
Payer: COMMERCIAL

## 2017-12-07 ENCOUNTER — HOSPITAL ENCOUNTER (OUTPATIENT)
Dept: RADIOLOGY | Facility: MEDICAL CENTER | Age: 27
End: 2017-12-07
Attending: FAMILY MEDICINE
Payer: COMMERCIAL

## 2017-12-07 ENCOUNTER — HOSPITAL ENCOUNTER (OUTPATIENT)
Facility: MEDICAL CENTER | Age: 27
End: 2017-12-07
Attending: OBSTETRICS & GYNECOLOGY
Payer: COMMERCIAL

## 2017-12-07 VITALS
WEIGHT: 154 LBS | DIASTOLIC BLOOD PRESSURE: 68 MMHG | HEIGHT: 65 IN | SYSTOLIC BLOOD PRESSURE: 103 MMHG | BODY MASS INDEX: 25.66 KG/M2

## 2017-12-07 DIAGNOSIS — J18.9 PNEUMONIA OF LEFT LOWER LOBE DUE TO INFECTIOUS ORGANISM: ICD-10-CM

## 2017-12-07 DIAGNOSIS — B37.31 VULVOVAGINAL CANDIDIASIS: ICD-10-CM

## 2017-12-07 DIAGNOSIS — L30.9 VULVAR DERMATITIS: ICD-10-CM

## 2017-12-07 PROCEDURE — 71020 DX-CHEST-2 VIEWS: CPT

## 2017-12-07 PROCEDURE — 87491 CHLMYD TRACH DNA AMP PROBE: CPT

## 2017-12-07 PROCEDURE — 99213 OFFICE O/P EST LOW 20 MIN: CPT | Performed by: OBSTETRICS & GYNECOLOGY

## 2017-12-07 PROCEDURE — 87591 N.GONORRHOEAE DNA AMP PROB: CPT

## 2017-12-07 PROCEDURE — 87210 SMEAR WET MOUNT SALINE/INK: CPT | Performed by: OBSTETRICS & GYNECOLOGY

## 2017-12-07 RX ORDER — TRIAMCINOLONE ACETONIDE 1 MG/G
OINTMENT TOPICAL
Qty: 40 G | Refills: 0 | Status: SHIPPED | OUTPATIENT
Start: 2017-12-07 | End: 2018-07-27

## 2017-12-07 RX ORDER — FLUCONAZOLE 150 MG/1
150 TABLET ORAL ONCE
Qty: 1 TAB | Refills: 0 | Status: SHIPPED | OUTPATIENT
Start: 2017-12-07 | End: 2017-12-07

## 2017-12-07 NOTE — PROGRESS NOTES
"Chief Complaint   Patient presents with   • Other     vaginal rash       History of present illness: 27 y.o. presents with above chief complaint. Location: vulvar; duration: 1 month; quality: burning/itching; severity: moderate; aggravating factors: tampons, wiping, alleviating factors: none. Pt tried vagisil and A&D ointment w/out relief.    Review of systems:  Pertinent positives documented in HPI and a comprehensive review of system is negative as follows:    All PMH, PSH, allergies, social history and FH reviewed and updated today:  Past Medical History:   Diagnosis Date   • H/O bladder infections        Past Surgical History:   Procedure Laterality Date   • APPENDECTOMY      at age 13       Allergies: No Known Allergies    Social History     Social History   • Marital status: Single     Spouse name: N/A   • Number of children: N/A   • Years of education: N/A     Occupational History   • [employer]      Social History Main Topics   • Smoking status: Never Smoker   • Smokeless tobacco: Never Used   • Alcohol use No   • Drug use: No   • Sexual activity: Yes     Partners: Male     Birth control/ protection: Condom     Other Topics Concern   • Not on file     Social History Narrative   • No narrative on file       Family History   Problem Relation Age of Onset   • Lung Disease Father      COPD   • Heart Disease Maternal Grandfather 73     Heart Attack       Physical exam:  Blood pressure 103/68, height 1.651 m (5' 5\"), weight 69.9 kg (154 lb), last menstrual period 12/01/2017.    GENERAL APPEARANCE: healthy, alert, no distress  NECK nontender, no masses, thyromegaly or nodules  HEART RRR with normal S1 and S2 ,no murmurs, no gallops, no peripheral edema  LUNG normal respiratory effort  ABDOMEN Abdomen soft, non-tender. BS normal. No masses,  No organomegaly  FEMALE GYN: normal female external genitalia, vulvar skin w/ erythema and skin chapping on labia majora bilaterally lateral to perineum and " introitus, white cheesy vaginal discharge noted, urethra is normal without discharge or scarring, no inguinal hernia, no bladder fullness or masses, normal cervix, normal anus and perineum.  NEURO Awake, alert and oriented x 3, Normal gait, no sensory deficits  SKIN No rashes except on vulvar exam, or ulcers or lesions seen  PSYCHIATRIC: Patient shows appropriate affect, is alert and oriented x3, intact judgment and insight.    [place]et [place]/RADHA performed and per my read:    Whiff: neg  Clue cells: neg  Yeast: hyphae present  Trich: neg  Dx: candida      A/P:    1. Vulvar dermatitis     2. Vulvovaginal candidiasis         Discussed proper vulvar hygiene  Mild soap, cleanse daily w/ water, pat to dry  Apply topical steroid ointment BID for up to 2wks  Diflucan 150mg PO   GC/CT cultures done  F/u in 1 month for annual WWE  RX sent electronically after discussion of side effects of medication with risks and benefits. Questions answered

## 2017-12-08 LAB
C TRACH DNA SPEC QL NAA+PROBE: NEGATIVE
N GONORRHOEA DNA SPEC QL NAA+PROBE: NEGATIVE
SPECIMEN SOURCE: NORMAL

## 2017-12-08 NOTE — TELEPHONE ENCOUNTER
----- Message from Destinee Brar M.D. sent at 12/7/2017  7:24 PM PST -----  Chest x-ray came back there is improvement of the left lower lobe pneumonia but this is not 100% gone.  She should follow-up with her primary care physician and and get another chest x-ray in the next 1-2 months to make sure this will completely clear.

## 2017-12-14 ENCOUNTER — OFFICE VISIT (OUTPATIENT)
Dept: MEDICAL GROUP | Age: 27
End: 2017-12-14
Payer: COMMERCIAL

## 2017-12-14 VITALS
SYSTOLIC BLOOD PRESSURE: 110 MMHG | WEIGHT: 158 LBS | HEART RATE: 110 BPM | BODY MASS INDEX: 25.39 KG/M2 | HEIGHT: 66 IN | OXYGEN SATURATION: 99 % | TEMPERATURE: 97.9 F | DIASTOLIC BLOOD PRESSURE: 68 MMHG

## 2017-12-14 DIAGNOSIS — J18.9 PNEUMONIA OF LEFT LOWER LOBE DUE TO INFECTIOUS ORGANISM: Primary | ICD-10-CM

## 2017-12-14 PROCEDURE — 99213 OFFICE O/P EST LOW 20 MIN: CPT | Performed by: FAMILY MEDICINE

## 2017-12-15 PROBLEM — R05.9 COUGH: Status: RESOLVED | Noted: 2017-11-02 | Resolved: 2017-12-15

## 2017-12-15 PROBLEM — J20.9 ACUTE BRONCHITIS: Status: RESOLVED | Noted: 2017-11-02 | Resolved: 2017-12-15

## 2017-12-15 PROBLEM — R11.0 NAUSEA: Status: RESOLVED | Noted: 2017-11-02 | Resolved: 2017-12-15

## 2017-12-15 PROBLEM — J18.9 COMMUNITY ACQUIRED PNEUMONIA OF LEFT LOWER LOBE OF LUNG: Status: RESOLVED | Noted: 2017-11-02 | Resolved: 2017-12-15

## 2017-12-15 NOTE — PROGRESS NOTES
"Subjective:   CC: pneumonia follow up    HPI:     Williams Singletary is a 27 y.o. female, established patient of the clinic, presents with the following concerns:     Pt was seen on 11/2/2017 by Dr. Adorno. She was initially diagnosed with acute bronchitis and was discharged with Augmentin. CXR revealed pneumonia. Her symptoms did not improve. So, she saw by Dr. Brar on 11/6/2017, who gave her Rocephin injection. Pt reports excellent response to Rocephin. Cough, fatigue, SoB, fever, and chills have since resolved. Dr. Brar advised pt to have repeat CXR in 4 weeks. Pt had repeated CXR done on 12/7/2017, which noted for interval improvement of PNA. She was advised by Dr. Brar to visit PCP for repeat CXR in 2-3 months to ensure resolution of radiographic findings. Pt denies fever, chills, hx of asthma, COPD, hx of tobacco abuse, or other chronic lung diseases. She endorses chronic exposure to tobacco as both her parents used to smoke inside the house when she was smaller.     Current medicines (including changes today)  Current Outpatient Prescriptions   Medication Sig Dispense Refill   • triamcinolone acetonide (KENALOG) 0.1 % Ointment Apply a thin layer of ointment twice per day to affected areas for up to 2 weeks 40 g 0     No current facility-administered medications for this visit.      She  has a past medical history of H/O bladder infections.    I personally reviewed patient's problem list, allergies, medications, family hx, social hx with patient and update EPIC.     REVIEW OF SYSTEMS:  CONSTITUTIONAL:  Denies night sweats, fatigue, malaise, lethargy, fever or chills.  RESPIRATORY:  Denies cough, wheeze, hemoptysis, or shortness of breath.  CARDIOVASCULAR:  Denies chest pains, palpitations, pedal edema     Objective:     Blood pressure 110/68, pulse (!) 110, temperature 36.6 °C (97.9 °F), height 1.683 m (5' 6.25\"), weight 71.7 kg (158 lb), last menstrual period 12/01/2017, SpO2 99 %. Body mass index is 25.31 " kg/m².    Physical Exam:  Constitutional: awake, alert, in no distress.  Skin: Warm, dry, good turgor, no rashes, bruises, ulcers in visible areas.  Neck: Trachea midline, no masses, no thyromegaly. No cervical or supraclavicular lymphadenopathy  Respiratory: Unlabored respiratory effort, lungs clear to auscultation, no wheezes, no rhonchi, no rales.  Cardiovascular: Normal S1, S2, no murmur, no pedal edema.  Psych: Oriented x3, affect and mood wnl, intact judgement and insight.       Assessment and Plan:   The following treatment plan was discussed    1. Pneumonia of left lower lobe due to infectious organism (CMS-HCC)  26 yo female who suffered acute CAP and was treated initially with Augmentin and Rocephin. Her CXR suggested LLL pneumonia. Today, her symptoms have resolved. Repeated CXR (after 4 weeks) noted for interval improvement of PNA. She was advised to have repeat CXR in 2-3 months to ensure resolution of abnormal radiographic findings. Pt presents today to discuss this issue. I personally reviewed the CXRs with patient. Given resolution of her symptoms, I advised against repeated CXR due to concern of unnecessary radiation exposure. Pt is advised to contact me if she continues to experience residue symptoms in couple months. If this is the case, repeated CXR might be indicated.       Kathy Ash M.D.      Followup: Return for As needed.    Please note that this dictation was created using voice recognition software. I have made every reasonable attempt to correct obvious errors, but I expect that there are errors of grammar and possibly content that I did not discover before finalizing the note.

## 2017-12-27 ENCOUNTER — TELEPHONE (OUTPATIENT)
Dept: MEDICAL GROUP | Age: 27
End: 2017-12-27

## 2017-12-28 ENCOUNTER — OFFICE VISIT (OUTPATIENT)
Dept: MEDICAL GROUP | Age: 27
End: 2017-12-28
Payer: COMMERCIAL

## 2017-12-28 VITALS
DIASTOLIC BLOOD PRESSURE: 64 MMHG | HEIGHT: 66 IN | HEART RATE: 78 BPM | WEIGHT: 158 LBS | OXYGEN SATURATION: 96 % | BODY MASS INDEX: 25.39 KG/M2 | SYSTOLIC BLOOD PRESSURE: 112 MMHG | TEMPERATURE: 97.8 F

## 2017-12-28 DIAGNOSIS — J10.1 INFLUENZA A: Primary | ICD-10-CM

## 2017-12-28 LAB
FLUAV+FLUBV AG SPEC QL IA: POSITIVE
INT CON NEG: NEGATIVE
INT CON POS: POSITIVE

## 2017-12-28 PROCEDURE — 87804 INFLUENZA ASSAY W/OPTIC: CPT | Performed by: FAMILY MEDICINE

## 2017-12-28 PROCEDURE — 99214 OFFICE O/P EST MOD 30 MIN: CPT | Performed by: FAMILY MEDICINE

## 2017-12-28 RX ORDER — CODEINE PHOSPHATE AND GUAIFENESIN 10; 100 MG/5ML; MG/5ML
5 SOLUTION ORAL EVERY 6 HOURS PRN
Qty: 140 ML | Refills: 0 | Status: SHIPPED | OUTPATIENT
Start: 2017-12-28 | End: 2018-01-04

## 2017-12-28 RX ORDER — OSELTAMIVIR PHOSPHATE 75 MG/1
75 CAPSULE ORAL 2 TIMES DAILY
Qty: 10 CAP | Refills: 0 | Status: SHIPPED | OUTPATIENT
Start: 2017-12-28 | End: 2018-01-02

## 2017-12-28 NOTE — TELEPHONE ENCOUNTER
ESTABLISHED PATIENT PRE-VISIT PLANNING     Note: Patient will not be contacted if there is no indication to call.     1.  Reviewed notes from the last few office visits within the medical group: Yes    2.  If any orders were placed at last visit or intended to be done for this visit (i.e. 6 mos follow-up), do we have Results/Consult Notes?        •  Labs - Labs were not ordered at last office visit.   Note: If patient appointment is for lab review and patient did not complete labs, check with provider if OK to reschedule patient until labs completed.       •  Imaging - Imaging was not ordered at last office visit.       •  Referrals - Referral ordered, patient has NOT been seen.    3. Is this appointment scheduled as a Hospital Follow-Up? No    4.  Immunizations were updated in Epic using WebIZ?: Epic matches WebIZ       •  Web Iz Recommendations: HEPATITIS B    5.  Patient is due for the following Health Maintenance Topics:   Health Maintenance Due   Topic Date Due   • IMM HEP A VACCINE (2 of 2 - Standard Series) 07/19/2017       - Patient is up-to-date on all Health Maintenance topics. No records have been requested at this time.    6.  Patient was NOT informed to arrive 15 min prior to their scheduled appointment and bring in their medication bottles.

## 2017-12-29 NOTE — PROGRESS NOTES
"Subjective:   CC:cough    HPI:     Williams Singletary is a 27 y.o. female with no major medical or surgical history. Patient was recently diagnosed with lobar pneumonia and was on 2 different antibiotics. She was seen several days ago. At the time she was feeling well. However, approximately 2 days ago patient developed acute cough, mild sore throat, ear congestion, decreased hearing without fever, chills, shortness of breath, dyspnea on exertion, chest pain. Patient denies history of chronic lung disease, history of tobacco abuse, or history of secondhand smoke exposure. 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 Several coworkers were diagnosed with influenza infection. Given recent history of pneumonia, patient is very concerned.     Current medicines (including changes today)  Current Outpatient Prescriptions   Medication Sig Dispense Refill   • oseltamivir (TAMIFLU) 75 MG Cap Take 1 Cap by mouth 2 Times a Day for 5 days. 10 Cap 0   • guaifenesin-codeine (ROBITUSSIN AC) Solution oral solution Take 5 mL by mouth every 6 hours as needed for Cough for up to 7 days. 140 mL 0   • triamcinolone acetonide (KENALOG) 0.1 % Ointment Apply a thin layer of ointment twice per day to affected areas for up to 2 weeks 40 g 0     No current facility-administered medications for this visit.      She  has a past medical history of H/O bladder infections.    I personally reviewed patient's problem list, allergies, medications, family hx, social hx with patient and update EPIC.     REVIEW OF SYSTEMS:  CONSTITUTIONAL:  Denies night sweats, fatigue, malaise, lethargy, fever or chills.  RESPIRATORY:  Denies  wheeze, hemoptysis, or shortness of breath.  CARDIOVASCULAR:  Denies chest pains, palpitations, pedal edema     Objective:     Blood pressure 112/64, pulse 78, temperature 36.6 °C (97.8 °F), height 1.676 m (5' 6\"), weight 71.7 kg (158 lb), last menstrual period 11/17/2017, SpO2 " 96 %. Body mass index is 25.5 kg/m².    Physical Exam:  Constitutional: awake, alert, in no distress.  Skin: Warm, dry, good turgor, no rashes, bruises, ulcers in visible areas.  Eye: conjunctiva clear, lids neg for edema or lesions.  ENMT: TM and auditory canals wnl. Oral and nasal mucosa wnl. Lips without lesions, good dentition, Mild hyperemic oropharynx with mild tonsillar hypertrophy.  Neck: Trachea midline, no masses, no thyromegaly. No cervical or supraclavicular lymphadenopathy  Respiratory: Unlabored respiratory effort, lungs clear to auscultation, no wheezes, no rhonchi, no rales.  Cardiovascular: Normal S1, S2, no murmur, no pedal edema.  Psych: Oriented x3, affect and mood wnl, intact judgement and insight.       Assessment and Plan:   The following treatment plan was discussed    1. Influenza A  27 years old female with no major medical or surgical history, who presents with acute onset of upper respiratory tract infection. She is tested positive for influenza A. She is within window for initiation of Tamiflu. Plans:  - oseltamivir (TAMIFLU) 75 MG Cap; Take 1 Cap by mouth 2 Times a Day for 5 days.  Dispense: 10 Cap; Refill: 0  - guaifenesin-codeine (ROBITUSSIN AC) Solution oral solution; Take 5 mL by mouth every 6 hours as needed for Cough for up to 7 days.  Dispense: 140 mL; Refill: 0  - POCT Influenza A/B  - Patient is advised to follow-up with me if her symptoms do not improve.    Kathy Ash M.D.      Followup: Return for As needed.    Please note that this dictation was created using voice recognition software. I have made every reasonable attempt to correct obvious errors, but I expect that there are errors of grammar and possibly content that I did not discover before finalizing the note.

## 2018-01-26 ENCOUNTER — HOSPITAL ENCOUNTER (OUTPATIENT)
Facility: MEDICAL CENTER | Age: 28
End: 2018-01-26
Attending: OBSTETRICS & GYNECOLOGY
Payer: COMMERCIAL

## 2018-01-26 ENCOUNTER — GYNECOLOGY VISIT (OUTPATIENT)
Dept: OBGYN | Facility: CLINIC | Age: 28
End: 2018-01-26
Payer: COMMERCIAL

## 2018-01-26 VITALS
WEIGHT: 160 LBS | HEIGHT: 66 IN | SYSTOLIC BLOOD PRESSURE: 118 MMHG | BODY MASS INDEX: 25.71 KG/M2 | DIASTOLIC BLOOD PRESSURE: 82 MMHG

## 2018-01-26 DIAGNOSIS — Z12.4 ROUTINE CERVICAL SMEAR: ICD-10-CM

## 2018-01-26 DIAGNOSIS — Z30.011 ENCOUNTER FOR PRESCRIPTION OF ORAL CONTRACEPTIVES: ICD-10-CM

## 2018-01-26 DIAGNOSIS — Z11.3 SCREENING EXAMINATION FOR VENEREAL DISEASE: ICD-10-CM

## 2018-01-26 DIAGNOSIS — Z01.419 WELL WOMAN EXAM: ICD-10-CM

## 2018-01-26 DIAGNOSIS — R30.0 DYSURIA: ICD-10-CM

## 2018-01-26 LAB
APPEARANCE UR: NORMAL
BILIRUB UR STRIP-MCNC: NORMAL MG/DL
COLOR UR AUTO: YELLOW
GLUCOSE UR STRIP.AUTO-MCNC: NEGATIVE MG/DL
KETONES UR STRIP.AUTO-MCNC: NEGATIVE MG/DL
LEUKOCYTE ESTERASE UR QL STRIP.AUTO: NORMAL
NITRITE UR QL STRIP.AUTO: NEGATIVE
PH UR STRIP.AUTO: 5.5 [PH] (ref 5–8)
PROT UR QL STRIP: NORMAL MG/DL
RBC UR QL AUTO: NEGATIVE
SP GR UR STRIP.AUTO: 1.02
UROBILINOGEN UR STRIP-MCNC: NORMAL MG/DL

## 2018-01-26 PROCEDURE — 87591 N.GONORRHOEAE DNA AMP PROB: CPT

## 2018-01-26 PROCEDURE — 81002 URINALYSIS NONAUTO W/O SCOPE: CPT | Performed by: OBSTETRICS & GYNECOLOGY

## 2018-01-26 PROCEDURE — 87491 CHLMYD TRACH DNA AMP PROBE: CPT

## 2018-01-26 PROCEDURE — 99395 PREV VISIT EST AGE 18-39: CPT | Performed by: OBSTETRICS & GYNECOLOGY

## 2018-01-26 PROCEDURE — 88175 CYTOPATH C/V AUTO FLUID REDO: CPT

## 2018-01-26 RX ORDER — NITROFURANTOIN 25; 75 MG/1; MG/1
100 CAPSULE ORAL 2 TIMES DAILY
Qty: 14 CAP | Refills: 0 | Status: SHIPPED | OUTPATIENT
Start: 2018-01-26 | End: 2018-07-27

## 2018-01-26 RX ORDER — DROSPIRENONE AND ETHINYL ESTRADIOL 0.02-3(28)
1 KIT ORAL DAILY
Qty: 28 TAB | Refills: 12 | Status: SHIPPED | OUTPATIENT
Start: 2018-01-26 | End: 2018-07-27

## 2018-01-26 RX ORDER — FLUCONAZOLE 100 MG/1
150 TABLET ORAL ONCE
Qty: 1 TAB | Refills: 0 | Status: SHIPPED | OUTPATIENT
Start: 2018-01-26 | End: 2018-01-26

## 2018-01-26 NOTE — PROGRESS NOTES
Williams Singletary is a 27 y.o.  female who presents for her Annual Gynecologic Exam      [facility] Comments: Pt presents for her annual well woman exam.   Pt has complaints of burning w/ urination, burning in vaginal area.  Feels like UTI but also feels like yeast infection. Using condoms currently for contraception but would like to restart OCPs. Hx of taking low dose OCPs in the past and did well.  Got leg cramping with higher dose OCPs.  Patient's last menstrual period was 2017.    Review of Systems:   Pertinent positives documented in HPI and all other systems reviewed & are negative    PGYN Hx: no hx abnl pap, no hx STDs    All PMH, PSH, allergies, social history and FH reviewed and updated today:  Past Medical History:   Diagnosis Date   • H/O bladder infections      Past Surgical History:   Procedure Laterality Date   • APPENDECTOMY      at age 13     Medications:   Current Outpatient Prescriptions Ordered in [facility]   Medication Sig Dispense Refill   • drospirenone-ethinyl estradiol (XENA) 3-0.02 MG per tablet Take 1 Tab by mouth every day. 28 Tab 12   • nitrofurantoin monohydr macro (MACROBID) 100 MG Cap Take 1 Cap by mouth 2 times a day. 14 Cap 0   • fluconazole (DIFLUCAN) 100 MG Tab Take 1.5 Tabs by mouth Once for 1 dose. 1 Tab 0   • triamcinolone acetonide (KENALOG) 0.1 % Ointment Apply a thin layer of ointment twice per day to affected areas for up to 2 weeks 40 g 0     No current Epic-ordered facility-administered medications on file.      Patient has no known allergies.  Social History     Social History   • Marital status: Single     Spouse name: N/A   • Number of children: N/A   • Years of education: N/A     Occupational History   • [employer] [place]      Social History Main Topics   • Smoking status: Never Smoker   • Smokeless tobacco: Never Used   • Alcohol use No   • Drug use: No   • Sexual activity: Yes     Partners: Male     Birth control/ protection: Condom     Other Topics Concern   •  "Not on file     Social History Narrative   • No narrative on file     Family History   Problem Relation Age of Onset   • Lung Disease Father      COPD   • Heart Disease Maternal Grandfather 73     Heart Attack          Objective:   Vital measurements:  Blood pressure 118/82, height 1.676 m (5' 6\"), weight 72.6 kg (160 lb), last menstrual period 12/28/2017, not currently breastfeeding.  Body mass index is 25.82 kg/m². (Goal BM I>18 <25)    Physical Exam   Nursing note and vitals reviewed.  Constitutional: She is oriented to person, place, and time. She appears well-developed and well-nourished. No distress.     HEENT:   Head: Normocephalic and atraumatic.   Right Ear: External ear normal.   Left Ear: External ear normal.   Nose: Nose normal.   Eyes: Conjunctivae and EOM are normal. Pupils are equal, round, and reactive to light. No scleral icterus.     Neck: Normal range of motion. Neck supple. No tracheal deviation present. No thyromegaly present. No cervical or supraclavicular lymphadenopathy.    Pulmonary/Chest: Effort normal and breath sounds normal. No respiratory distress. She has no wheezes. She has no rales. She exhibits no tenderness.     Cardiovascular: Regular, rate and rhythm. No edema.    Breast:  Symmetrical, normal consistency without masses., No dimpling or skin changes, Normal nipples without discharge, no axillary lymphadenopathy    Abdominal: Soft. Bowel sounds are normal. She exhibits no distension and no mass. No tenderness. She has no rebound and no guarding.     Genitourinary:  Pelvic exam was performed with patient supine.  External genitalia with no abnormal pigmentation, labial fusion, rash, tenderness, lesion or injury to the labia bilaterally.  BUS normal  Vagina is pink and moist with no lesions, erythema, tenderness or bleeding. No foreign body around the vagina or signs of injury, small amount thick white discharge   Cervix exhibits no motion tenderness, no discharge and no friability, " no lesions.   Uterus is not deviated, not enlarged, not fixed and not tender.  Right adnexa displays no mass, no tenderness and no fullness.  Left adnexa displays no mass, no tenderness and no fullness.     Musculoskeletal: Normal range of motion. Non tender. She exhibits no edema and no tenderness.     Lymphadenopathy: She has no cervical or supraclavicular adenopathy.     Neurological: She is alert and oriented to person, place, and time. She exhibits normal muscle tone.     Skin: Skin is warm and dry. No rash noted. She is not diaphoretic. No erythema. No pallor.     Psychiatric: She has a normal mood and affect. Her behavior is normal. Judgment and thought content normal.        Assessment:     1. Well woman exam  THINPREP RFLX HPV ASCUS W/CTNG    POCT Urinalysis   2. Routine cervical smear  THINPREP RFLX HPV ASCUS W/CTNG   3. Screening examination for venereal disease  THINPREP RFLX HPV ASCUS W/CTNG   4. Dysuria     5. Encounter for prescription of oral contraceptives           Plan:   Pap and physical exam performed  Self breast awareness discussed.  Encouraged exercise and proper diet.  Mammograms starting @ age 40 annually.  See medications and orders placed in encounter report.  Rx of diflucan 150mg x 1 for yeast infection  Rx of Macrobid x 7d for UTI, increase fluids and take cranberry pill daily  Rx of Ghazal OCPs for contraception    Today I discussed with the patient risks, benefits, alternatives to birth control pills. We discussed estrogen progesterone-containing pills. I discussed normal use of pills, I.e. take the pill on a daily basis, preferably at the same time each day. Also I discussed side effects of oral contraceptives  which can include nausea, vomiting, irregular bleeding, breast tenderness. We discussed risks associated with birth control pills mainly risk of blood clots in the legs that could travel to the lungs.  We discussed effectiveness of pills in preventing pregnancy to be between 95  and 98%.  Also discussed proper use of pills and what to do should the patient skip any pills. Discussed using backup form of birth control for the first month of use and with any missed pills.  Pt understands these risks and desires to take OCPs.

## 2018-01-27 LAB
C TRACH DNA GENITAL QL NAA+PROBE: NEGATIVE
CYTOLOGY REG CYTOL: NORMAL
N GONORRHOEA DNA GENITAL QL NAA+PROBE: NEGATIVE
SPECIMEN SOURCE: NORMAL

## 2018-07-27 ENCOUNTER — HOSPITAL ENCOUNTER (OUTPATIENT)
Dept: LAB | Facility: MEDICAL CENTER | Age: 28
End: 2018-07-27
Attending: PHYSICIAN ASSISTANT
Payer: COMMERCIAL

## 2018-07-27 ENCOUNTER — OFFICE VISIT (OUTPATIENT)
Dept: MEDICAL GROUP | Age: 28
End: 2018-07-27
Payer: COMMERCIAL

## 2018-07-27 VITALS
HEIGHT: 65 IN | OXYGEN SATURATION: 97 % | WEIGHT: 161.8 LBS | TEMPERATURE: 97.3 F | SYSTOLIC BLOOD PRESSURE: 100 MMHG | HEART RATE: 73 BPM | BODY MASS INDEX: 26.96 KG/M2 | DIASTOLIC BLOOD PRESSURE: 60 MMHG

## 2018-07-27 DIAGNOSIS — R53.83 FATIGUE, UNSPECIFIED TYPE: ICD-10-CM

## 2018-07-27 DIAGNOSIS — L74.510 AXILLARY HYPERHIDROSIS: ICD-10-CM

## 2018-07-27 DIAGNOSIS — B00.1 COLD SORE: ICD-10-CM

## 2018-07-27 DIAGNOSIS — Z23 NEED FOR VACCINATION: ICD-10-CM

## 2018-07-27 LAB
ERYTHROCYTE [DISTWIDTH] IN BLOOD BY AUTOMATED COUNT: 45.2 FL (ref 35.9–50)
HCT VFR BLD AUTO: 39.7 % (ref 37–47)
HGB BLD-MCNC: 12.3 G/DL (ref 12–16)
IRON SATN MFR SERPL: 6 % (ref 15–55)
IRON SERPL-MCNC: 26 UG/DL (ref 40–170)
MCH RBC QN AUTO: 25.4 PG (ref 27–33)
MCHC RBC AUTO-ENTMCNC: 31 G/DL (ref 33.6–35)
MCV RBC AUTO: 82 FL (ref 81.4–97.8)
PLATELET # BLD AUTO: 197 K/UL (ref 164–446)
PMV BLD AUTO: 13.1 FL (ref 9–12.9)
RBC # BLD AUTO: 4.84 M/UL (ref 4.2–5.4)
TIBC SERPL-MCNC: 445 UG/DL (ref 250–450)
WBC # BLD AUTO: 5.3 K/UL (ref 4.8–10.8)

## 2018-07-27 PROCEDURE — 90471 IMMUNIZATION ADMIN: CPT | Performed by: PHYSICIAN ASSISTANT

## 2018-07-27 PROCEDURE — 85027 COMPLETE CBC AUTOMATED: CPT

## 2018-07-27 PROCEDURE — 90632 HEPA VACCINE ADULT IM: CPT | Performed by: PHYSICIAN ASSISTANT

## 2018-07-27 PROCEDURE — 84443 ASSAY THYROID STIM HORMONE: CPT

## 2018-07-27 PROCEDURE — 36415 COLL VENOUS BLD VENIPUNCTURE: CPT

## 2018-07-27 PROCEDURE — 83540 ASSAY OF IRON: CPT

## 2018-07-27 PROCEDURE — 84480 ASSAY TRIIODOTHYRONINE (T3): CPT

## 2018-07-27 PROCEDURE — 99214 OFFICE O/P EST MOD 30 MIN: CPT | Mod: 25 | Performed by: PHYSICIAN ASSISTANT

## 2018-07-27 PROCEDURE — 82607 VITAMIN B-12: CPT

## 2018-07-27 PROCEDURE — 83550 IRON BINDING TEST: CPT

## 2018-07-27 PROCEDURE — 84439 ASSAY OF FREE THYROXINE: CPT

## 2018-07-27 RX ORDER — ACYCLOVIR 50 MG/G
CREAM TOPICAL
COMMUNITY
Start: 2018-07-27 | End: 2018-09-27 | Stop reason: SDUPTHER

## 2018-07-27 ASSESSMENT — PATIENT HEALTH QUESTIONNAIRE - PHQ9: CLINICAL INTERPRETATION OF PHQ2 SCORE: 0

## 2018-07-27 NOTE — ASSESSMENT & PLAN NOTE
This is an ongoing problem.  The patient reports that she has tried many specialty deodorants, but they either do not work or they give her rash.  She is requesting a referral to dermatology for evaluation and for prescription deodorant.

## 2018-07-27 NOTE — ASSESSMENT & PLAN NOTE
This is a new problem.  The patient is complaining of persistent fatigue.  She reports that she sleeps about 8 hours a night, but will often take a 4 hour nap during the day in addition to this.  She is exercising more frequently, going on hikes and finding ways to stay active.  She does admit to a poor diet, and rarely eats vegetables.  She works during the day shift, and does not have any interruption to her sleep schedule.  However, she is concerned about the amount that she sleeps, her inability to maintain her energy levels, and asks for lab testing to investigate cause of this.    She was diagnosed with pneumonia last November, and then had the flu in December.  Since then, she denies any other illnesses despite this fatigue.    She does admit to some feelings of mild depression and anxiety.  However, she scores a 0 on the PHQ 2 today.  She also complains of mild heart palpitations, but blames this on her anxiety.    She reports normal periods of 5-6 days, that are not particularly heavy.  She reports that these have decreased in longevity and severity since stopping the birth control.

## 2018-07-27 NOTE — PROGRESS NOTES
CC: fatigue    History of Present Illness: This is a 28 y.o. female established patient who presents today for fatigue and requesting referral to dermatology for ongoing hyperhidrosis.     Fatigue  This is a new problem.  The patient is complaining of persistent fatigue.  She reports that she sleeps about 8 hours a night, but will often take a 4 hour nap during the day in addition to this.  She is exercising more frequently, going on hikes and finding ways to stay active.  She does admit to a poor diet, and rarely eats vegetables.  She works during the day shift, and does not have any interruption to her sleep schedule.  However, she is concerned about the amount that she sleeps, her inability to maintain her energy levels, and asks for lab testing to investigate cause of this.    She was diagnosed with pneumonia last November, and then had the flu in December.  Since then, she denies any other illnesses despite this fatigue.    She does admit to some feelings of mild depression and anxiety.  However, she scores a 0 on the PHQ 2 today.  She also complains of mild heart palpitations, but blames this on her anxiety.    She reports normal periods of 5-6 days, that are not particularly heavy.  She reports that these have decreased in longevity and severity since stopping the birth control.    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  The patient reports that she has tried many specialty deodorants, but they either do not work or they give her rash.  She is requesting a referral to dermatology for evaluation and for prescription deodorant.      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    Current Outpatient Prescriptions   Medication Sig Dispense Refill   • ZOVIRAX 5 % Cream Apply  to affected area(s).       No current facility-administered medications for this  "visit.        Social History   Substance Use Topics   • Smoking status: Never Smoker   • Smokeless tobacco: Never Used   • Alcohol use No     Social History     Social History Narrative   • No narrative on file       Family History   Problem Relation Age of Onset   • Lung Disease Father         COPD   • Heart Disease Maternal Grandfather 73        Heart Attack       Review of Systems:    Constitutional: Positive for fatigue. Negative for fever, chills, unexpected weight change, malaise and generalized weakness.   Respiratory: Negative for cough.   Cardiovascular: Positive for occasional palpitations. Negative for chest pain.   Gastrointestinal: Negative for diarrhea or constipation.   Skin: Positive for excessive sweating of axillae. Negative for rash, itching, cyanotic skin color change.   Psychiatric/Behavioral: Positive for mild depression and anxiety. Negative for suicidal/homicidal ideation and memory loss.            Exam:    Blood pressure 100/60, pulse 73, temperature 36.3 °C (97.3 °F), height 1.651 m (5' 5\"), weight 73.4 kg (161 lb 12.8 oz), SpO2 97 %. Body mass index is 26.92 kg/m².    General: Normal appearing. No distress.  Eyes: Conjunctiva clear, lids without ptosis, pupils equal, round and reactive to light  Neck: Supple without JVD or bruit. Thyroid is not enlarged.  Lymph: No cervical, supra- or infraclavicular lymphadenopathy.  Pulmonary: Normal effort. Clear to ausculation in all fields. No rales, ronchi, or wheezing.  Cardiovascular: Regular rate and rhythm without murmurs, rubs or gallops.  Musculoskeletal: Normal gait. No extremity cyanosis, clubbing, or edema.  Neurologic: Grossly non-focal, DTRs intact.  Skin: Warm and dry.  No obvious lesions.  Psych: Normal mood and affect. Alert and oriented x3. Judgment and insight is normal.      Health Maintenance: Hep A updated today    Assessment/Plan:  1. Need for vaccination  - Hepatitis A Vaccine Adult IM    2. Axillary hyperhidrosis  - REFERRAL TO " DERMATOLOGY    3. Fatigue, unspecified type  Uncontrolled.  We will check baseline labs to see if there are any abnormalities or deficiencies.  [facility]  I also discussed the importance of a variable and healthy diet with increased vegetable consumption to ensure that she is not deficient in B12 or iron.  - FREE THYROXINE; Future  - TRIIDOTHYRONINE; Future  - TSH; Future  - CBC WITHOUT DIFFERENTIAL; Future  - IRON/TOTAL IRON BIND; Future  - VITAMIN B12; Future    4. Cold sore  - ZOVIRAX 5 % Cream; Apply  to affected area(s).      Return if symptoms worsen or fail to improve.    Patient was in agreement with this treatment plan and seemed to understand without barriers. All questions were encouraged and answered. Reviewed signs and symptoms of when to seek emergency medical care.     Please note that this dictation was created using voice recognition software. I have made every reasonable attempt to correct obvious errors, but I expect that there may be errors of grammar and possibly content that I did not discover before finalizing the note.

## 2018-07-28 LAB
T3 SERPL-MCNC: 110 NG/DL (ref 60–181)
T4 FREE SERPL-MCNC: 0.83 NG/DL (ref 0.53–1.43)
TSH SERPL DL<=0.005 MIU/L-ACNC: 0.63 UIU/ML (ref 0.38–5.33)
VIT B12 SERPL-MCNC: 197 PG/ML (ref 211–911)

## 2018-08-16 ENCOUNTER — DOCUMENTATION (OUTPATIENT)
Dept: OCCUPATIONAL MEDICINE | Facility: CLINIC | Age: 28
End: 2018-08-16

## 2018-08-17 ENCOUNTER — HOSPITAL ENCOUNTER (OUTPATIENT)
Dept: LAB | Facility: MEDICAL CENTER | Age: 28
End: 2018-08-17
Payer: COMMERCIAL

## 2018-08-17 LAB
BDY FAT % MEASURED: 31.8 %
BP DIAS: 65 MMHG
BP SYS: 127 MMHG
CHOLEST SERPL-MCNC: 157 MG/DL (ref 100–199)
DIABETES HTDIA: NO
EVENT NAME HTEVT: NORMAL
FASTING HTFAS: YES
GLUCOSE SERPL-MCNC: 87 MG/DL (ref 65–99)
HDLC SERPL-MCNC: 56 MG/DL
HYPERTENSION HTHYP: NO
LDLC SERPL CALC-MCNC: 89 MG/DL
SCREENING LOC CITY HTCIT: NORMAL
SCREENING LOC STATE HTSTA: NORMAL
SCREENING LOCATION HTLOC: NORMAL
SMOKING HTSMO: NO
SUBSCRIBER ID HTSID: NORMAL
TRIGL SERPL-MCNC: 58 MG/DL (ref 0–149)

## 2018-08-17 PROCEDURE — S5190 WELLNESS ASSESSMENT BY NONPH: HCPCS

## 2018-08-17 PROCEDURE — 36415 COLL VENOUS BLD VENIPUNCTURE: CPT

## 2018-08-17 PROCEDURE — 80061 LIPID PANEL: CPT

## 2018-08-17 PROCEDURE — 82947 ASSAY GLUCOSE BLOOD QUANT: CPT

## 2018-09-06 ENCOUNTER — EH NON-PROVIDER (OUTPATIENT)
Dept: OCCUPATIONAL MEDICINE | Facility: CLINIC | Age: 28
End: 2018-09-06

## 2018-09-06 DIAGNOSIS — Z02.89 ENCOUNTER FOR OCCUPATIONAL HEALTH EXAMINATION INVOLVING RESPIRATOR: ICD-10-CM

## 2018-09-06 PROCEDURE — 94375 RESPIRATORY FLOW VOLUME LOOP: CPT | Performed by: PREVENTIVE MEDICINE

## 2018-09-11 ENCOUNTER — APPOINTMENT (RX ONLY)
Dept: URBAN - METROPOLITAN AREA CLINIC 31 | Facility: CLINIC | Age: 28
Setting detail: DERMATOLOGY
End: 2018-09-11

## 2018-09-11 DIAGNOSIS — B07.8 OTHER VIRAL WARTS: ICD-10-CM

## 2018-09-11 DIAGNOSIS — L74.51 PRIMARY FOCAL HYPERHIDROSIS: ICD-10-CM

## 2018-09-11 PROBLEM — L74.510 PRIMARY FOCAL HYPERHIDROSIS, AXILLA: Status: ACTIVE | Noted: 2018-09-11

## 2018-09-11 PROCEDURE — ? BENIGN DESTRUCTION

## 2018-09-11 PROCEDURE — ? PRESCRIPTION

## 2018-09-11 PROCEDURE — ? COUNSELING

## 2018-09-11 PROCEDURE — 17110 DESTRUCTION B9 LES UP TO 14: CPT

## 2018-09-11 PROCEDURE — 99201: CPT | Mod: 25

## 2018-09-11 PROCEDURE — ? ADDITIONAL NOTES

## 2018-09-11 RX ORDER — GLYCOPYRROLATE 1 MG/1
TABLET ORAL BID
Qty: 60 | Refills: 4 | Status: ERX | COMMUNITY
Start: 2018-09-11

## 2018-09-11 RX ADMIN — GLYCOPYRROLATE: 1 TABLET ORAL at 20:21

## 2018-09-11 ASSESSMENT — LOCATION ZONE DERM
LOCATION ZONE: AXILLAE
LOCATION ZONE: FINGER

## 2018-09-11 ASSESSMENT — LOCATION DETAILED DESCRIPTION DERM
LOCATION DETAILED: RIGHT DISTAL PALMAR INDEX FINGER
LOCATION DETAILED: RIGHT AXILLARY VAULT
LOCATION DETAILED: LEFT AXILLARY VAULT

## 2018-09-11 ASSESSMENT — LOCATION SIMPLE DESCRIPTION DERM
LOCATION SIMPLE: RIGHT INDEX FINGER
LOCATION SIMPLE: LEFT AXILLARY VAULT
LOCATION SIMPLE: RIGHT AXILLARY VAULT

## 2018-09-11 NOTE — PROCEDURE: ADDITIONAL NOTES
Detail Level: Simple
Additional Notes: Discuss condition and treatment options in detail with the patient such as Drysol or MiraDry. \\nPhone number given for our [place] office. \\nPatient has had breakouts while using Drysol. \\nWill send Rx for Robinul today.
Additional Notes: Discussed other treatment options with patient. \\nTrial of Liquid Nitrogen done today

## 2018-09-11 NOTE — HPI: SWEATING (HYPERHIDROSIS)
Sweating Severity Scale: 4- The sweating is intolerable and always interferes with daily activities
How Severe Is It?: moderate
Is This A New Presentation, Or A Follow-Up?: Sweating
Additional History: Patient states she breaks out with Hives when she uses the Drysol.

## 2018-09-11 NOTE — PROCEDURE: BENIGN DESTRUCTION
Include Z78.9 (Other Specified Conditions Influencing Health Status) As An Associated Diagnosis?: No
Anesthesia Volume In Cc: 0.5
Consent: The patient's consent was obtained including but not limited to risks of crusting, scabbing, blistering, scarring, darker or lighter pigmentary change, recurrence, incomplete removal and infection.
Medical Necessity Clause: This procedure was medically necessary because the lesions that were treated were:
Post-Care Instructions: I reviewed with the patient in detail post-care instructions. Patient is to wear sunprotection, and avoid picking at any of the treated lesions. Pt may apply Vaseline to crusted or scabbing areas.
Detail Level: Detailed
Treatment Number (Will Not Render If 0): 0
Medical Necessity Information: It is in your best interest to select a reason for this procedure from the list below. All of these items fulfill various CMS LCD requirements except the new and changing color options.

## 2018-09-24 ENCOUNTER — NON-PROVIDER VISIT (OUTPATIENT)
Dept: OCCUPATIONAL MEDICINE | Facility: CLINIC | Age: 28
End: 2018-09-24

## 2018-09-24 DIAGNOSIS — Z23 NEED FOR VACCINATION: ICD-10-CM

## 2018-09-27 ENCOUNTER — PATIENT MESSAGE (OUTPATIENT)
Dept: MEDICAL GROUP | Age: 28
End: 2018-09-27

## 2018-09-27 DIAGNOSIS — B00.1 COLD SORE: ICD-10-CM

## 2018-09-27 RX ORDER — ACYCLOVIR 50 MG/G
1 CREAM TOPICAL 4 TIMES DAILY PRN
Qty: 1 TUBE | Refills: 3 | Status: SHIPPED | OUTPATIENT
Start: 2018-09-27 | End: 2018-10-19

## 2018-09-29 PROCEDURE — 90686 IIV4 VACC NO PRSV 0.5 ML IM: CPT | Performed by: PREVENTIVE MEDICINE

## 2018-10-19 ENCOUNTER — HOSPITAL ENCOUNTER (OUTPATIENT)
Facility: MEDICAL CENTER | Age: 28
End: 2018-10-19
Attending: FAMILY MEDICINE
Payer: COMMERCIAL

## 2018-10-19 ENCOUNTER — OFFICE VISIT (OUTPATIENT)
Dept: MEDICAL GROUP | Facility: PHYSICIAN GROUP | Age: 28
End: 2018-10-19
Payer: COMMERCIAL

## 2018-10-19 VITALS
TEMPERATURE: 98.4 F | DIASTOLIC BLOOD PRESSURE: 62 MMHG | RESPIRATION RATE: 16 BRPM | BODY MASS INDEX: 27.16 KG/M2 | HEART RATE: 78 BPM | WEIGHT: 163 LBS | SYSTOLIC BLOOD PRESSURE: 118 MMHG | HEIGHT: 65 IN | OXYGEN SATURATION: 95 %

## 2018-10-19 DIAGNOSIS — B37.31 VAGINAL YEAST INFECTION: ICD-10-CM

## 2018-10-19 DIAGNOSIS — N30.00 ACUTE CYSTITIS WITHOUT HEMATURIA: ICD-10-CM

## 2018-10-19 LAB
APPEARANCE UR: NORMAL
BILIRUB UR STRIP-MCNC: NORMAL MG/DL
COLOR UR AUTO: NORMAL
GLUCOSE UR STRIP.AUTO-MCNC: NORMAL MG/DL
KETONES UR STRIP.AUTO-MCNC: 0.2 MG/DL
LEUKOCYTE ESTERASE UR QL STRIP.AUTO: NORMAL
NITRITE UR QL STRIP.AUTO: NORMAL
PH UR STRIP.AUTO: NORMAL [PH] (ref 5–8)
PROT UR QL STRIP: 5.5 MG/DL
RBC UR QL AUTO: NORMAL
SP GR UR STRIP.AUTO: 1.02
UROBILINOGEN UR STRIP-MCNC: 0.2 MG/DL

## 2018-10-19 PROCEDURE — 99214 OFFICE O/P EST MOD 30 MIN: CPT | Performed by: FAMILY MEDICINE

## 2018-10-19 PROCEDURE — 87077 CULTURE AEROBIC IDENTIFY: CPT

## 2018-10-19 PROCEDURE — 81002 URINALYSIS NONAUTO W/O SCOPE: CPT | Performed by: FAMILY MEDICINE

## 2018-10-19 PROCEDURE — 87186 SC STD MICRODIL/AGAR DIL: CPT

## 2018-10-19 PROCEDURE — 87086 URINE CULTURE/COLONY COUNT: CPT

## 2018-10-19 RX ORDER — FLUCONAZOLE 150 MG/1
150 TABLET ORAL ONCE
Qty: 1 TAB | Refills: 0 | Status: SHIPPED | OUTPATIENT
Start: 2018-10-19 | End: 2018-10-19

## 2018-10-19 RX ORDER — CEFDINIR 300 MG/1
300 CAPSULE ORAL 2 TIMES DAILY
Qty: 10 CAP | Refills: 0 | Status: SHIPPED | OUTPATIENT
Start: 2018-10-19 | End: 2018-10-24

## 2018-10-19 NOTE — PROGRESS NOTES
"Chief Complaint   Patient presents with   • UTI   • Vaginitis       HPI:  Symptom onset: Two days ago   Current symptoms: Painful, urgent, frequent voids. No blood noted in urine.  Feels like previous UTIs.  Since onset symptoms are: Unchanged  Treatments tried: Increase water intake, cranberry juice  Associated symptoms: Positive for white vaginal discharge.  Negative for fever, flank pain, nausea and vomiting, pelvic pain.  History is negative for frequent UTI.  Last UTI in January 2018.  Urine culture in October 2017 was positive for Klebsiella.    ROS:  Denies fever, chills, vomiting or abdominal pain.    I reviewed the patient's medications, allergies and medical history:  Current Outpatient Prescriptions   Medication Sig Dispense Refill   • fluconazole (DIFLUCAN) 150 MG tablet Take 1 Tab by mouth Once for 1 dose. 1 Tab 0   • cefdinir (OMNICEF) 300 MG Cap Take 1 Cap by mouth 2 times a day for 5 days. 10 Cap 0     No current facility-administered medications for this visit.      Patient has no known allergies.  Past Medical History:   Diagnosis Date   • H/O bladder infections        OBJECTIVE:  Blood pressure 118/62, pulse 78, temperature 36.9 °C (98.4 °F), resp. rate 16, height 1.651 m (5' 5\"), weight 73.9 kg (163 lb), SpO2 95 %, not currently breastfeeding.  Gen: Alert, NAD.  Chest: Lungs clear to auscultation, CV RRR.  Abdomen: Soft, tender in suprapubic region. No CVAT. Normal bowel sounds.    Lab Results   Component Value Date/Time    POCCOLOR dark yellow 10/19/2018 04:38 PM    POCAPPEAR cloudy 10/19/2018 04:38 PM    POCLEUKEST neg 10/19/2018 04:38 PM    POCNITRITE pos 10/19/2018 04:38 PM    POCUROBILIGE 0.2 10/19/2018 04:38 PM    POCPROTEIN 5.5 10/19/2018 04:38 PM    POCURPH neg 10/19/2018 04:38 PM    POCBLOOD neg 10/19/2018 04:38 PM    POCSPGRV 1.025 10/19/2018 04:38 PM    POCKETONES 0.2 10/19/2018 04:38 PM    POCBILIRUBIN neg 10/19/2018 04:38 PM    POCGLUCUA neg 10/19/2018 04:38 PM     "     ASSESSMENT/PLAN:    1. Acute cystitis without hematuria  2. Vaginal yeast infection    1. Abnormal urine dipstick in office. Urine sent for culture. Start antibiotics. Fluconazole also prescribed.  2. Provided education to drink plenty of fluids, wipe front to back every void and bowel movement.   3. Return to clinic if symptoms not improving within 3-4 days or in case of vomiting, fever, increasing pain.

## 2018-10-20 DIAGNOSIS — N30.00 ACUTE CYSTITIS WITHOUT HEMATURIA: ICD-10-CM

## 2018-10-22 LAB
BACTERIA UR CULT: ABNORMAL
BACTERIA UR CULT: ABNORMAL
SIGNIFICANT IND 70042: ABNORMAL
SITE SITE: ABNORMAL
SOURCE SOURCE: ABNORMAL

## 2019-07-26 ENCOUNTER — HOSPITAL ENCOUNTER (OUTPATIENT)
Dept: LAB | Facility: MEDICAL CENTER | Age: 29
End: 2019-07-26
Payer: COMMERCIAL

## 2019-07-26 LAB
BDY FAT % MEASURED: 33.8 %
BP DIAS: 66 MMHG
BP SYS: 113 MMHG
CHOLEST SERPL-MCNC: 151 MG/DL (ref 100–199)
DIABETES HTDIA: NO
EVENT NAME HTEVT: NORMAL
FASTING HTFAS: YES
GLUCOSE SERPL-MCNC: 93 MG/DL (ref 65–99)
HDLC SERPL-MCNC: 50 MG/DL
HYPERTENSION HTHYP: NO
LDLC SERPL CALC-MCNC: 89 MG/DL
SCREENING LOC CITY HTCIT: NORMAL
SCREENING LOC STATE HTSTA: NORMAL
SCREENING LOCATION HTLOC: NORMAL
SMOKING HTSMO: NO
SUBSCRIBER ID HTSID: NORMAL
TRIGL SERPL-MCNC: 62 MG/DL (ref 0–149)

## 2019-07-26 PROCEDURE — 80061 LIPID PANEL: CPT

## 2019-07-26 PROCEDURE — 82947 ASSAY GLUCOSE BLOOD QUANT: CPT

## 2019-07-26 PROCEDURE — S5190 WELLNESS ASSESSMENT BY NONPH: HCPCS

## 2019-07-26 PROCEDURE — 36415 COLL VENOUS BLD VENIPUNCTURE: CPT

## 2019-08-20 ENCOUNTER — TELEPHONE (OUTPATIENT)
Dept: MEDICAL GROUP | Age: 29
End: 2019-08-20

## 2019-08-20 DIAGNOSIS — Z00.8 NUTRITIONAL ASSESSMENT: ICD-10-CM

## 2019-08-20 NOTE — TELEPHONE ENCOUNTER
----- Message from Mercedes Vidal sent at 8/20/2019  8:32 AM PDT -----  Regarding: FW: Referral  Contact: [phone]      ----- Message -----  From: Williams Singletary  Sent: 8/20/2019   8:25 AM PDT  To: Len Osorio  Subject: Referral                                         Topic: Referral Status    Hi, I was wondering if I could please get a referral for a nutritionist?      Thank you,   Williams Singletary

## 2019-08-20 NOTE — TELEPHONE ENCOUNTER
1. Caller Name: Williams Singletary                                           Call Back Number: [phone] (home)         Patient approves a detailed voicemail message: no    2. SPECIFIC Action To Be Taken: Referral pending, please sign.

## 2019-08-21 NOTE — TELEPHONE ENCOUNTER
The referral to nutritionist was placed according to patient's request.       Nancy Adhikari M.D.

## 2019-09-23 ENCOUNTER — IMMUNIZATION (OUTPATIENT)
Dept: OCCUPATIONAL MEDICINE | Facility: CLINIC | Age: 29
End: 2019-09-23

## 2019-09-23 DIAGNOSIS — Z23 NEED FOR VACCINATION: ICD-10-CM

## 2019-09-23 PROCEDURE — 90686 IIV4 VACC NO PRSV 0.5 ML IM: CPT | Performed by: PREVENTIVE MEDICINE

## 2019-11-05 ENCOUNTER — NON-PROVIDER VISIT (OUTPATIENT)
Dept: HEALTH INFORMATION MANAGEMENT | Facility: MEDICAL CENTER | Age: 29
End: 2019-11-05
Payer: COMMERCIAL

## 2019-11-05 VITALS — HEIGHT: 66 IN | WEIGHT: 171.9 LBS | BODY MASS INDEX: 27.63 KG/M2

## 2019-11-05 DIAGNOSIS — Z00.8 NUTRITIONAL ASSESSMENT: ICD-10-CM

## 2019-11-05 PROCEDURE — 97802 MEDICAL NUTRITION INDIV IN: CPT | Performed by: DIETITIAN, REGISTERED

## 2019-11-05 NOTE — PROGRESS NOTES
"11/5/2019    Nancy Adhikari M.D.  29 y.o.   Time in/out: 2:27-3:14 PM    Anthropometrics/Objective  Vitals:    11/05/19 1518   Weight: 78 kg (171 lb 14.4 oz)   Height: 1.676 m (5' 6\")       Body mass index is 27.75 kg/m².    Stated Goal Weight: 130 lb  Estimated Caloric needs 1526 Kcal ([place])  See comprehensive patient history form for further information     Subjective:  - pt here to learn about healthy eating and healthy foods, no previous nutrition education  - unintentionally gained 10 lbs in the past 3 months  - does not know how to cook, eats fast food 3-4x per week  - drinking multiple sugar sweetened beverages per day (sweetend ice tea, sugar in coffee, regular soda  - skips afternoon snack and dinner  - not exercising currently    Nutrition Diagnosis (PES Statement)    · Food and nutrition related knowledge deficit related to lack of prior nutrition education as evidenced by pt reports not knowing how to eat healthy and recent weight gain of 10 lbs.       Client history:  Condition(s) associated with a diagnosis or treatment (specify) fatigue    Biochemical data, medical test and procedures  No results found for: HBA1C@  No results found for: POCGLUCOSE  Lab Results   Component Value Date/Time    CHOLSTRLTOT 151 07/26/2019 02:53 PM    LDL 89 07/26/2019 02:53 PM    HDL 50 07/26/2019 02:53 PM    TRIGLYCERIDE 62 07/26/2019 02:53 PM         Nutrition Intervention      Meal and Snack  Recommend a general/healthful diet    Comprehensive Nutrition education Instruction or training leading to in-depth nutrition related knowledge about:  Combine carb, protein and fat at each meal, Menu Planning, Portion control and Handouts provided regarding topics discussed: Snack list    Monitoring & Evaluation Plan    Behavioral-Environmental:  Behavior: start having 3 balanced meals per day  Physical activity as tolerated    Food / Nutrient Intake:  Fluid/Beverage intake: reduce the amount of sugar sweetened beverages  Food " intake: follow the plate method for meal balance and portion control    Physical Signs / Symptoms:  Weight change -1-2 lbs per week    Assessment Notes: Williams was referred for a nutritional assessment and to learn about how to eat healthy. We reviewed the basics of nutrition and My Plate. We discussed having balanced meals and having 3 meals per day. She currently is only eating fruits and vegetables 1 day per week. Reviewed non-starchy vegetables and made a list of what she likes and can start adding to her meals. We reviewed the protein sources that she likes to include at her meals. Briefly reviewed the snack list and healthier snack options to replace the chips and candy. Discussed reducing the amount of sugar sweetened beverages throughout the day and substituting them with water. Pt set her goals to eat 3 meals per day and add more water into her day. Next visit suggest reviewing physical activity and discussing starting to track meals.      F/u: 2 weeks with BERTRAND

## 2019-12-03 ENCOUNTER — APPOINTMENT (OUTPATIENT)
Dept: HEALTH INFORMATION MANAGEMENT | Facility: MEDICAL CENTER | Age: 29
End: 2019-12-03
Payer: COMMERCIAL

## 2020-08-28 ENCOUNTER — HOSPITAL ENCOUNTER (OUTPATIENT)
Dept: LAB | Facility: MEDICAL CENTER | Age: 30
End: 2020-08-28
Payer: COMMERCIAL

## 2020-08-28 LAB
BDY FAT % MEASURED: 33.4 %
BP DIAS: 77 MMHG
BP SYS: 123 MMHG
CHOLEST SERPL-MCNC: 148 MG/DL (ref 100–199)
DIABETES HTDIA: NO
EVENT NAME HTEVT: NORMAL
FASTING HTFAS: YES
GLUCOSE SERPL-MCNC: 93 MG/DL (ref 65–99)
HDLC SERPL-MCNC: 56 MG/DL
HYPERTENSION HTHYP: NO
LDLC SERPL CALC-MCNC: 81 MG/DL
SCREENING LOC CITY HTCIT: NORMAL
SCREENING LOC STATE HTSTA: NORMAL
SCREENING LOCATION HTLOC: NORMAL
SMOKING HTSMO: NO
SUBSCRIBER ID HTSID: NORMAL
TRIGL SERPL-MCNC: 55 MG/DL (ref 0–149)

## 2020-08-28 PROCEDURE — 82947 ASSAY GLUCOSE BLOOD QUANT: CPT

## 2020-08-28 PROCEDURE — 36415 COLL VENOUS BLD VENIPUNCTURE: CPT

## 2020-08-28 PROCEDURE — 80061 LIPID PANEL: CPT

## 2020-08-28 PROCEDURE — S5190 WELLNESS ASSESSMENT BY NONPH: HCPCS

## 2020-09-23 ENCOUNTER — IMMUNIZATION (OUTPATIENT)
Dept: OCCUPATIONAL MEDICINE | Facility: CLINIC | Age: 30
End: 2020-09-23

## 2020-09-23 DIAGNOSIS — Z23 NEED FOR VACCINATION: ICD-10-CM

## 2020-09-23 PROCEDURE — 90686 IIV4 VACC NO PRSV 0.5 ML IM: CPT | Performed by: PREVENTIVE MEDICINE

## 2020-12-20 DIAGNOSIS — Z23 NEED FOR VACCINATION: ICD-10-CM

## 2020-12-31 ENCOUNTER — IMMUNIZATION (OUTPATIENT)
Dept: FAMILY PLANNING/WOMEN'S HEALTH CLINIC | Facility: IMMUNIZATION CENTER | Age: 30
End: 2020-12-31
Attending: FAMILY MEDICINE
Payer: COMMERCIAL

## 2020-12-31 DIAGNOSIS — Z23 NEED FOR VACCINATION: ICD-10-CM

## 2020-12-31 DIAGNOSIS — Z23 ENCOUNTER FOR VACCINATION: Primary | ICD-10-CM

## 2020-12-31 PROCEDURE — 0011A MODERNA SARS-COV-2 VACCINE: CPT

## 2020-12-31 PROCEDURE — 91301 MODERNA SARS-COV-2 VACCINE: CPT

## 2021-01-30 PROCEDURE — 0012A MODERNA SARS-COV-2 VACCINE: CPT

## 2021-01-30 PROCEDURE — 91301 MODERNA SARS-COV-2 VACCINE: CPT

## 2021-02-01 ENCOUNTER — IMMUNIZATION (OUTPATIENT)
Dept: FAMILY PLANNING/WOMEN'S HEALTH CLINIC | Facility: IMMUNIZATION CENTER | Age: 31
End: 2021-02-01
Payer: COMMERCIAL

## 2021-02-01 DIAGNOSIS — Z23 ENCOUNTER FOR VACCINATION: Primary | ICD-10-CM

## 2021-09-28 ENCOUNTER — IMMUNIZATION (OUTPATIENT)
Dept: OCCUPATIONAL MEDICINE | Facility: CLINIC | Age: 31
End: 2021-09-28

## 2021-09-28 DIAGNOSIS — Z23 NEED FOR VACCINATION: Primary | ICD-10-CM

## 2021-09-28 PROCEDURE — 90686 IIV4 VACC NO PRSV 0.5 ML IM: CPT | Performed by: NURSE PRACTITIONER

## 2021-09-30 ENCOUNTER — OFFICE VISIT (OUTPATIENT)
Dept: MEDICAL GROUP | Age: 31
End: 2021-09-30
Payer: COMMERCIAL

## 2021-09-30 VITALS
TEMPERATURE: 98.4 F | WEIGHT: 162 LBS | OXYGEN SATURATION: 96 % | HEART RATE: 73 BPM | DIASTOLIC BLOOD PRESSURE: 74 MMHG | SYSTOLIC BLOOD PRESSURE: 120 MMHG | HEIGHT: 66 IN | BODY MASS INDEX: 26.03 KG/M2

## 2021-09-30 DIAGNOSIS — Z00.00 PE (PHYSICAL EXAM), ANNUAL: ICD-10-CM

## 2021-09-30 DIAGNOSIS — Z76.89 ENCOUNTER TO ESTABLISH CARE WITH NEW DOCTOR: ICD-10-CM

## 2021-09-30 DIAGNOSIS — N92.0 MENORRHAGIA WITH REGULAR CYCLE: ICD-10-CM

## 2021-09-30 DIAGNOSIS — K21.9 GASTROESOPHAGEAL REFLUX DISEASE, UNSPECIFIED WHETHER ESOPHAGITIS PRESENT: ICD-10-CM

## 2021-09-30 DIAGNOSIS — Z01.83 ENCOUNTER FOR BLOOD TYPING: ICD-10-CM

## 2021-09-30 DIAGNOSIS — D50.0 IRON DEFICIENCY ANEMIA DUE TO CHRONIC BLOOD LOSS: ICD-10-CM

## 2021-09-30 PROBLEM — R53.83 FATIGUE: Status: RESOLVED | Noted: 2018-07-27 | Resolved: 2021-09-30

## 2021-09-30 PROCEDURE — 99395 PREV VISIT EST AGE 18-39: CPT | Performed by: FAMILY MEDICINE

## 2021-09-30 RX ORDER — OMEPRAZOLE 20 MG/1
20 CAPSULE, DELAYED RELEASE ORAL EVERY MORNING
Qty: 90 CAPSULE | Refills: 0 | Status: SHIPPED
Start: 2021-09-30 | End: 2021-11-04

## 2021-09-30 ASSESSMENT — PATIENT HEALTH QUESTIONNAIRE - PHQ9: CLINICAL INTERPRETATION OF PHQ2 SCORE: 0

## 2021-09-30 NOTE — LETTER
[facility]  Kathy Ash M.D.  [address] [place] [address]  Fax: [phone]   Authorization for Release/Disclosure of   Protected Health Information   Name: WILLIAMS SINGLETARY : 1990 SSN: [id number]   Address: [address] [place] [address] Phone:    [phone] (home)    I authorize the entity listed below to release/disclose the PHI below to:   [facility]/Kathy Ash M.D. and Kathy Ash M.D.   Provider or Entity Name:  [facility]   Phone:      Fax:  [phone]   Reason for request: continuity of care   Information to be released:    [  ] LAST COLONOSCOPY,  including any PATH REPORT and follow-up  [  ] LAST FIT/COLOGUARD RESULT [  ] LAST DEXA  [  ] LAST MAMMOGRAM  [xx  ] LAST PAP  [  ] LAST LABS [  ] RETINA EXAM REPORT  [  ] IMMUNIZATION RECORDS  [  ] Release all info      [  ] Check here and initial the line next to each item to release ALL health information INCLUDING  _____ Care and treatment for drug and / or alcohol abuse  _____ HIV testing, infection status, or AIDS  _____ Genetic Testing    DATES OF SERVICE OR TIME PERIOD TO BE DISCLOSED: _____________  I understand and acknowledge that:  * This Authorization may be revoked at any time by you in writing, except if your health information has already been used or disclosed.  * Your health information that will be used or disclosed as a result of you signing this authorization could be re-disclosed by the recipient. If this occurs, your re-disclosed health information may no longer be protected by State or Federal laws.  * You may refuse to sign this Authorization. Your refusal will not affect your ability to obtain treatment.  * This Authorization becomes effective upon signing and will  on (date) __________.      If no date is indicated, this Authorization will  one (1) year from the signature date.    Name: Williams Singletary    Signature:   Date:     2021       PLEASE FAX REQUESTED RECORDS BACK  TO: [phone]

## 2021-09-30 NOTE — PROGRESS NOTES
Subjective:   CC: establish care     HPI:     Williams Singletary is a 31 y.o. female, established patient of the clinic.     Patient is healthy without major medical or surgical history. She is , sexually active, using condoms for contraception. She recently attempted to donate blood, but was told that she has anemia. She complained of chronic fatigue, but otherwise has no other symptoms of anemia. She endorses heavy menstruation 5 days/cycles with at least 2-3 heavy days/cycle. She needs to change super absorbent pads every 2-3 hours. She tried OCP in the past, but did not tolerate it. She is not interested in hormonal treatment at this time. Negative history of rectal bleeding, hematuria, melena. She does not have bleeding disorders or thyroid diseases.     She also complained of morning gastric reflux for several months. She eats dinner late and goes to bed 1-2 hours after dinner. Symptoms some time wakes her up at night. She drinks 1 cup of coffee per day. Negative history of excessive consumption of NSAID or spicy foods. Negative hematochezia, melena, abdominal pain, nausea, vomiting.     Current medicines (including changes today)  Current Outpatient Medications   Medication Sig Dispense Refill   • omeprazole (PRILOSEC) 20 MG delayed-release capsule Take 1 Capsule by mouth every morning. 90 Capsule 0     No current facility-administered medications for this visit.     She  has a past medical history of H/O bladder infections.    I personally reviewed patient's problem list, allergies, medications, family hx, social hx with patient and update EPIC.     REVIEW OF SYSTEMS:  CONSTITUTIONAL:  Denies night sweats, fatigue, malaise, lethargy, fever or chills.  RESPIRATORY:  Denies cough, wheeze, hemoptysis, or shortness of breath.  CARDIOVASCULAR:  Denies chest pains, palpitations, pedal edema     Objective:     /74 (BP Location: Right arm, Patient Position: Sitting, BP Cuff Size: Adult)   Pulse 73   Temp 36.9 °C  "(98.4 °F) (Temporal)   Ht 1.676 m (5' 6\")   Wt 73.5 kg (162 lb)   SpO2 96%  Body mass index is 26.15 kg/m².    Physical Exam:  Constitutional: awake, alert, in no distress.  Skin: Warm, dry, good turgor, no rashes, bruises, ulcers in visible areas.  Eye: conjunctiva clear, lids neg for edema or lesions.  ENMT: TM and auditory canals wnl.   Neck: Trachea midline, no masses, no thyromegaly. No cervical or supraclavicular lymphadenopathy  Respiratory: Unlabored respiratory effort, lungs clear to auscultation, no wheezes, no rales.  Cardiovascular: Normal S1, S2, no murmur, no pedal edema.  Abdomen: Soft, non-tender to palpation, active BS, no hernia, no hepatosplenomegaly, negative rebound or guarding.   Psych: Oriented x3, affect and mood wnl, intact judgement and insight.       Assessment and Plan:   The following treatment plan was discussed    1. Menorrhagia with regular cycle  2. Iron deficiency anemia due to chronic blood loss  , sexually active, uses condoms for contraception.   She was found to have anemia as she tried to donate blood.   Anemia is likely secondary to menorrhagia per history.   Treatment for menorrhagia discussed. She will consider them and get back to me later.   Historically, she is not able to tolerates OCPs.   Vitron-C BID recommended.   - CBC WITH DIFFERENTIAL; Future  - Comp Metabolic Panel; Future  - HEMOGLOBIN A1C; Future  - IRON/TOTAL IRON BIND; Future  - FERRITIN; Future  - TSH WITH REFLEX TO FT4; Future  - VITAMIN B12; Future  - FOLATE; Future    3. Gastroesophageal reflux disease, unspecified whether esophagitis present  - omeprazole (PRILOSEC) 20 MG delayed-release capsule; Take 1 Capsule by mouth every morning.  Dispense: 90 Capsule; Refill: 0  - dietary and behavioral modification recommended and discussed.     4. Encounter for blood typing  - ABO AND RH DETERMINATION; Future    5. Encounter to establish care with new doctor  6. PE (physical exam), annual  General health " and wellness counseling provided.      Pap is done with OBGYN, will request records.   - CBC WITH DIFFERENTIAL; Future  - Comp Metabolic Panel; Future  - HEMOGLOBIN A1C; Future  - IRON/TOTAL IRON BIND; Future  - FERRITIN; Future  - TSH WITH REFLEX TO FT4; Future       Ly RODNEY Ash M.D.      Followup: Return for As needed.    Please note that this dictation was created using voice recognition software. I have made every reasonable attempt to correct obvious errors, but I expect that there are errors of grammar and possibly content that I did not discover before finalizing the note.

## 2021-10-05 ENCOUNTER — HOSPITAL ENCOUNTER (OUTPATIENT)
Dept: LAB | Facility: MEDICAL CENTER | Age: 31
End: 2021-10-05
Attending: FAMILY MEDICINE
Payer: COMMERCIAL

## 2021-10-05 DIAGNOSIS — Z00.00 PE (PHYSICAL EXAM), ANNUAL: ICD-10-CM

## 2021-10-05 DIAGNOSIS — Z01.83 ENCOUNTER FOR BLOOD TYPING: ICD-10-CM

## 2021-10-05 DIAGNOSIS — D50.0 IRON DEFICIENCY ANEMIA DUE TO CHRONIC BLOOD LOSS: ICD-10-CM

## 2021-10-05 LAB
ABO GROUP BLD: NORMAL
ALBUMIN SERPL BCP-MCNC: 4.9 G/DL (ref 3.2–4.9)
ALBUMIN/GLOB SERPL: 1.8 G/DL
ALP SERPL-CCNC: 126 U/L (ref 30–99)
ALT SERPL-CCNC: 22 U/L (ref 2–50)
ANION GAP SERPL CALC-SCNC: 11 MMOL/L (ref 7–16)
AST SERPL-CCNC: 26 U/L (ref 12–45)
BASOPHILS # BLD AUTO: 0.7 % (ref 0–1.8)
BASOPHILS # BLD: 0.03 K/UL (ref 0–0.12)
BILIRUB SERPL-MCNC: 0.5 MG/DL (ref 0.1–1.5)
BUN SERPL-MCNC: 14 MG/DL (ref 8–22)
CALCIUM SERPL-MCNC: 9.7 MG/DL (ref 8.5–10.5)
CHLORIDE SERPL-SCNC: 107 MMOL/L (ref 96–112)
CO2 SERPL-SCNC: 20 MMOL/L (ref 20–33)
CREAT SERPL-MCNC: 0.59 MG/DL (ref 0.5–1.4)
EOSINOPHIL # BLD AUTO: 0.08 K/UL (ref 0–0.51)
EOSINOPHIL NFR BLD: 1.9 % (ref 0–6.9)
ERYTHROCYTE [DISTWIDTH] IN BLOOD BY AUTOMATED COUNT: 46 FL (ref 35.9–50)
EST. AVERAGE GLUCOSE BLD GHB EST-MCNC: 91 MG/DL
FASTING STATUS PATIENT QL REPORTED: NORMAL
FERRITIN SERPL-MCNC: 5.7 NG/ML (ref 10–291)
FOLATE SERPL-MCNC: 6.3 NG/ML
GLOBULIN SER CALC-MCNC: 2.7 G/DL (ref 1.9–3.5)
GLUCOSE SERPL-MCNC: 97 MG/DL (ref 65–99)
HBA1C MFR BLD: 4.8 % (ref 4–5.6)
HCT VFR BLD AUTO: 37.3 % (ref 37–47)
HGB BLD-MCNC: 11.8 G/DL (ref 12–16)
IMM GRANULOCYTES # BLD AUTO: 0.07 K/UL (ref 0–0.11)
IMM GRANULOCYTES NFR BLD AUTO: 1.7 % (ref 0–0.9)
IRON SATN MFR SERPL: 12 % (ref 15–55)
IRON SERPL-MCNC: 39 UG/DL (ref 40–170)
LYMPHOCYTES # BLD AUTO: 1.28 K/UL (ref 1–4.8)
LYMPHOCYTES NFR BLD: 30.8 % (ref 22–41)
MCH RBC QN AUTO: 25.8 PG (ref 27–33)
MCHC RBC AUTO-ENTMCNC: 31.6 G/DL (ref 33.6–35)
MCV RBC AUTO: 81.6 FL (ref 81.4–97.8)
MONOCYTES # BLD AUTO: 0.49 K/UL (ref 0–0.85)
MONOCYTES NFR BLD AUTO: 11.8 % (ref 0–13.4)
NEUTROPHILS # BLD AUTO: 2.2 K/UL (ref 2–7.15)
NEUTROPHILS NFR BLD: 53.1 % (ref 44–72)
NRBC # BLD AUTO: 0 K/UL
NRBC BLD-RTO: 0 /100 WBC
PLATELET # BLD AUTO: 149 K/UL (ref 164–446)
PMV BLD AUTO: 12.7 FL (ref 9–12.9)
POTASSIUM SERPL-SCNC: 4.1 MMOL/L (ref 3.6–5.5)
PROT SERPL-MCNC: 7.6 G/DL (ref 6–8.2)
RBC # BLD AUTO: 4.57 M/UL (ref 4.2–5.4)
RH BLD: NORMAL
SODIUM SERPL-SCNC: 138 MMOL/L (ref 135–145)
TIBC SERPL-MCNC: 330 UG/DL (ref 250–450)
TSH SERPL DL<=0.005 MIU/L-ACNC: 1.52 UIU/ML (ref 0.38–5.33)
UIBC SERPL-MCNC: 291 UG/DL (ref 110–370)
VIT B12 SERPL-MCNC: 236 PG/ML (ref 211–911)
WBC # BLD AUTO: 4.2 K/UL (ref 4.8–10.8)

## 2021-10-05 PROCEDURE — 83550 IRON BINDING TEST: CPT

## 2021-10-05 PROCEDURE — 82728 ASSAY OF FERRITIN: CPT

## 2021-10-05 PROCEDURE — 83540 ASSAY OF IRON: CPT

## 2021-10-05 PROCEDURE — 82746 ASSAY OF FOLIC ACID SERUM: CPT

## 2021-10-05 PROCEDURE — 84443 ASSAY THYROID STIM HORMONE: CPT

## 2021-10-05 PROCEDURE — 86901 BLOOD TYPING SEROLOGIC RH(D): CPT

## 2021-10-05 PROCEDURE — 86900 BLOOD TYPING SEROLOGIC ABO: CPT

## 2021-10-05 PROCEDURE — 85025 COMPLETE CBC W/AUTO DIFF WBC: CPT

## 2021-10-05 PROCEDURE — 36415 COLL VENOUS BLD VENIPUNCTURE: CPT

## 2021-10-05 PROCEDURE — 83036 HEMOGLOBIN GLYCOSYLATED A1C: CPT

## 2021-10-05 PROCEDURE — 80053 COMPREHEN METABOLIC PANEL: CPT

## 2021-10-05 PROCEDURE — 82607 VITAMIN B-12: CPT

## 2021-10-07 ENCOUNTER — TELEPHONE (OUTPATIENT)
Dept: MEDICAL GROUP | Age: 31
End: 2021-10-07

## 2021-10-07 NOTE — TELEPHONE ENCOUNTER
----- Message from Sangeeta Maria M.D. sent at 10/6/2021  1:36 PM PDT -----  Please notify patient that labs are reviewed, and to schedule appointment with Dr. Soler within next 2 to 3 weeks to discuss.

## 2021-10-07 NOTE — TELEPHONE ENCOUNTER
Phone Number Called: [phone] (home)       Call outcome: Did not leave a detailed message. Requested patient to call back.    Message: LVM 1st attempt

## 2021-11-04 ENCOUNTER — OFFICE VISIT (OUTPATIENT)
Dept: MEDICAL GROUP | Age: 31
End: 2021-11-04
Payer: COMMERCIAL

## 2021-11-04 VITALS
HEART RATE: 71 BPM | HEIGHT: 66 IN | DIASTOLIC BLOOD PRESSURE: 72 MMHG | OXYGEN SATURATION: 96 % | TEMPERATURE: 98.1 F | SYSTOLIC BLOOD PRESSURE: 114 MMHG | BODY MASS INDEX: 25.88 KG/M2 | WEIGHT: 161 LBS

## 2021-11-04 DIAGNOSIS — J30.2 SEASONAL ALLERGIES: ICD-10-CM

## 2021-11-04 DIAGNOSIS — N92.0 MENORRHAGIA WITH REGULAR CYCLE: ICD-10-CM

## 2021-11-04 DIAGNOSIS — Z00.00 PE (PHYSICAL EXAM), ANNUAL: ICD-10-CM

## 2021-11-04 DIAGNOSIS — K21.9 GASTROESOPHAGEAL REFLUX DISEASE, UNSPECIFIED WHETHER ESOPHAGITIS PRESENT: ICD-10-CM

## 2021-11-04 DIAGNOSIS — D50.0 IRON DEFICIENCY ANEMIA DUE TO CHRONIC BLOOD LOSS: ICD-10-CM

## 2021-11-04 PROCEDURE — 99395 PREV VISIT EST AGE 18-39: CPT | Performed by: FAMILY MEDICINE

## 2021-11-04 ASSESSMENT — FIBROSIS 4 INDEX: FIB4 SCORE: 1.12

## 2021-11-04 NOTE — PATIENT INSTRUCTIONS
Long-Acting Reversible Contraception (LARC): IUD and Implant    What are long-acting reversible contraception (LARC) methods?  Long-acting reversible contraception (LARC) methods include the intrauterine device (IUD) and the birth control implant. Both methods are highly effective in preventing pregnancy, last for several years, and are easy to use. Both are reversible--if you want to become pregnant or if you want to stop using them, you can have them removed at any time.    How effective are LARC methods?  The IUD and the implant are the most effective forms of reversible birth control available. During the first year of typical use, fewer than 1 in 100 women using an IUD or an implant will become pregnant. This rate is in the same range as that for sterilization.    How do LARC methods compare with other methods of contraception?  Over the long term, LARC methods are 20 times more effective than birth control pills, the patch, or the ring.    What is the intrauterine device (IUD)?  The IUD is a small, T-shaped, plastic device that is inserted into and left inside the uterus. There are two types of IUDs:    1. The hormonal IUD releases progestin. Different brands of hormonal IUDs are approved for use for up to 5 years and for up to 3 years.    - Mirena: 5 years   - Kyleena: 5 years.    - Kina: 3 years    2. The copper IUD does not contain hormones. It is approved for use for up to 10 years. This device is called ParaGard         How does the IUD work?  Both types of IUDs work mainly by preventing fertilization of the egg by the sperm. The hormonal IUDs also thicken cervical mucus, which makes it harder for sperm to enter the uterus and fertilize the egg, and keep the lining of the uterus thin, which makes it less likely that a fertilized egg will attach to it. .    What are the benefits of the IUD?  The IUD has the following benefits:    · Once it is in place, you do not have to do anything else to prevent  pregnancy.  · No one can tell that you are using birth control.  · It does not interfere with sex or daily activities.  · It can be inserted immediately after an , a miscarriage, or childbirth and while breastfeeding.  · Almost all women are able to use an IUD.  · If you wish to become pregnant or if you want to stop using it, you can simply have the IUD removed.  · The hormonal IUD helps decrease menstrual pain and heavy menstrual bleeding.  · The copper IUD also is the most effective form of emergency contraception.    How is the IUD inserted?  · A health care professional must insert and remove the IUD. He or she will review your medical history and will perform a pelvic exam. To insert the IUD, the IUD is placed in a slender plastic tube. The tube is inserted into the vagina and guided through the cervix into the uterus. The tube is withdrawn, leaving the IUD in place.    Will I feel anything when the IUD is inserted?  Insertion of the IUD may cause some discomfort. Taking over-the-counter pain relief medication before the procedure may help. The IUD has a string made of thin plastic threads. After insertion, the strings are trimmed so that 1-2 inches extend past the cervix into your vagina. The strings should not bother you.    What are possible side effects of use of the IUD?  With the copper IUD, menstrual pain and bleeding may increase at first. Bleeding between periods may occur. Both effects are common in the first few months of use. Pain and heavy bleeding usually decrease within 1 year of use.    The hormonal IUDs may cause spotting and irregular bleeding in the first 3-6 months of use. The amount of menstrual bleeding and the length of the menstrual period usually decrease over time. Menstrual pain also usually decreases. A few women also may have side effects related to the hormones in these IUDs. These side effects may include headaches, nausea, depression, and breast tenderness.    What are  possible risks of use of the IUD?  Serious complications from use of an IUD are rare. However, some women do have problems. These problems usually happen during or soon after insertion:    · The IUD may come out of the uterus. This happens in about 5% of users in the first year of using the IUD.  · The IUD can perforate (or zavala) the wall of the uterus during insertion. It is rare and occurs in only about 1 out of every 1,000 insertions.  · Pelvic inflammatory disease (PID) is an infection of the uterus and fallopian tubes. PID may cause scarring in the reproductive organs, which may make it harder to become pregnant later. The risk of PID is only slightly increased in the first 20 days after insertion of an IUD, but the overall risk still is low (fewer than 1 in 100 women).  · Rarely, pregnancy may occur while a woman is using an IUD.  · In the rare case that a pregnancy occurs with the IUD in place, there is a higher chance that it will be an ectopic pregnancy.    What is the birth control implant? (Nexplanon)  The birth control implant is a single flexible ariana about the size of a matchstick that is inserted under the skin in the upper arm. It releases progestin into the body. It protects against pregnancy for up to 3 years.  This device is called Nexplanon        How does the birth control implant work?  The progestin in the implant prevents pregnancy mainly by stopping ovulation. In addition, the progestin in the implant thickens cervical mucus, which makes it harder for sperm to enter the uterus and fertilize the egg. Progestin also keeps the lining of the uterus thin, making it less likely that a fertilized egg will attach to it.    What are the benefits of the birth control implant?  The implant has the following benefits:    · Once it is in place, you do not have to do anything else to prevent pregnancy.  · No one can tell that you are using birth control.  · It can be inserted immediately after an  , a miscarriage, or childbirth and while breastfeeding.  · It does not interfere with sex or daily activities.  · Almost all women are able to use the implant.  · If you wish to become pregnant or if you want to stop using it, you can simply have the implant removed.    How is the birth control implant inserted?  The implant is inserted into your arm by a health care professional. A small area on your upper arm is numbed with a local anesthetic. No incision is made. The implant is placed under the skin with a special . The procedure takes only a few minutes.    How is the birth control implant removed?  To remove the implant, the area is numbed. One small incision is made. The implant then is removed.    What are possible side effects of use of the birth control implant?  The most common side effect of the implant is unpredictable bleeding. For some women, these bleeding patterns improve over time. Some women have less menstrual pain while using the implant. In some women, bleeding stops completely. Other common side effects include mood changes, headaches, acne, and depression. Some women have reported weight gain while using the implant, but it is not clear whether it is related to the implant.    What are possible risks of use of the birth control implant?  Possible risks include problems with insertion or removal of the implant. These problems occur in less than 2% of women. Although rare, if a woman becomes pregnant while the implant is inserted, there is a slightly increased risk that it will be an ectopic pregnancy.    Glossary  Birth Control Implant: A small, single ariana that is inserted under the skin in the upper arm by a health care provider. It releases a hormone and protects against pregnancy for up to 3 years.    Cervix: The lower, narrow end of the uterus at the top of the vagina.    Ectopic Pregnancy: A pregnancy in which the fertilized egg begins to grow in a place other than inside  the uterus, usually in one of the fallopian tubes.    Egg: The female reproductive cell produced in and released from the ovaries; also called the ovum.    Emergency Contraception: Methods that are used to prevent pregnancy after a woman has had sex without birth control, after the method she used has failed, or if a woman is raped    Fertilization: Joining of the egg and sperm.    Intrauterine Device (IUD): A small device that is inserted and left inside the uterus to prevent pregnancy.    Ovulation: The release of an egg from one of the ovaries.    Pelvic Exam: A physical examination of a woman’s reproductive organs.    Pelvic Inflammatory Disease (PID): An infection of the uterus, fallopian tubes, and nearby pelvic structures.    Progestin: A synthetic form of progesterone that is similar to the hormone produced naturally by the body.    Sperm: A cell produced in the male testes that can fertilize a female egg.    Sterilization: A permanent method of birth control.    Uterus: A muscular organ located in the female pelvis that contains and nourishes the developing fetus during pregnancy.    Vagina: A tube-like structure surrounded by muscles leading from the uterus to the outside of the body.    If you have further questions, contact your primary care physician.   -------------------------------------------------------------------------------------------------    [id number]: Designed as an aid to patients, this document sets forth current information and opinions related to women’s health. The information does not dictate an exclusive course of treatment or procedure to be followed and should not be construed as excluding other acceptable methods of practice. Variations, taking into account the needs of the individual patient, resources, and limitations unique to the institution or type of practice, may be appropriate.    Copyright May 2016 by the [employer]

## 2021-11-06 NOTE — PROGRESS NOTES
"Subjective:   CC: Annual physical    HPI:     Williams Singletary is a 31 y.o. female, established patient of the clinic.     Patient suffered chronic menorrhagia leading to mild anemia.  Patient was previously taking OCP which did help significantly with her symptoms.  However, patient decided to stop taking OCP due to side effects.  She is not interested in any other hormonal treatment at this time.  She did say that after discontinuation of OCP, the length of her menstruation reduces from 8 days per cycle to 6 days per cycle.  She is single, not currently sexually active.  She does have desire for having children in the future.  Patient has not been taking iron supplement consistently.  Except for minor tightness, patient denies dizziness, exercise intolerance, chest pain, shortness of breath.    Patient has mild GERD that is controlled with dietary modification and over-the-counter acid reducer as needed.  PPI was prescribed at previous office visit, however, patient states that she did not  this medication due to improvement of symptoms.    Seasonal allergy is controlled with over-the-counter medication.    Current medicines (including changes today)  No current outpatient medications on file.     No current facility-administered medications for this visit.     She  has a past medical history of H/O bladder infections.    I personally reviewed patient's problem list, allergies, medications, family hx, social hx with patient and update EPIC.     REVIEW OF SYSTEMS:  CONSTITUTIONAL:  Denies night sweats, fatigue, malaise, lethargy, fever or chills.  RESPIRATORY:  Denies cough, wheeze, hemoptysis, or shortness of breath.  CARDIOVASCULAR:  Denies chest pains, palpitations, pedal edema     Objective:     /72 (BP Location: Right arm, Patient Position: Sitting, BP Cuff Size: Adult)   Pulse 71   Temp 36.7 °C (98.1 °F) (Temporal)   Ht 1.676 m (5' 6\")   Wt 73 kg (161 lb)   SpO2 96%  Body mass index is 25.99 " kg/m².    Physical Exam:  Constitutional: awake, alert, in no distress.  Skin: Warm, dry, good turgor, no rashes, bruises, ulcers in visible areas.  Eye: conjunctiva clear, lids neg for edema or lesions.  Neck: Trachea midline, no masses, no thyromegaly. No cervical or supraclavicular lymphadenopathy  Respiratory: Unlabored respiratory effort, lungs clear to auscultation, no wheezes, no rales.  Cardiovascular: Normal S1, S2, no murmur, no pedal edema.  Abdomen: Soft, non-tender to palpation, active BS, no hernia, no hepatosplenomegaly, negative rebound or guarding.   Psych: Oriented x3, affect and mood wnl, intact judgement and insight.       Assessment and Plan:   The following treatment plan was discussed    1. Iron deficiency anemia due to chronic blood loss  2. Menorrhagia with regular cycle  Chronic, previously controlled with combined OCPs.  However, patient discontinued OCPs due to side effects.  She is not interested in further treatment at this time.  She states that after discontinuation of OCPs, her length of her menstruation reduces from 8 days/cycle to 6 days/cycle.  Recent labs showed mild normocytic anemia and iron deficiency. Patient has not been consistent with oral iron supplement.  Patient is not interested in further treatment at this time.  She is asymptomatic.  - CBC WITH DIFFERENTIAL; Future  - FERRITIN; Future  - IRON/TOTAL IRON BIND; Future  - US-PELVIC COMPLETE (TRANSABDOMINAL/TRANSVAGINAL) (COMBO); Future  -  Discussed treatment options. Handout provided. Patient will do some research and contact me as needed.   - iron supplement consistently.     3. Gastroesophageal reflux disease, unspecified whether esophagitis present  Mild, controlled with dietary modification and over-the-counter acid reducer as needed.    4. Seasonal allergies  Controlled with over-the-counter medication.    5. PE (physical exam), annual  General health and wellness counseling provided.          Kathy Ash,  M.D.      Followup: Return in about 6 months (around 5/4/2022) for Pap.    Please note that this dictation was created using voice recognition software. I have made every reasonable attempt to correct obvious errors, but I expect that there are errors of grammar and possibly content that I did not discover before finalizing the note.

## 2021-12-09 ENCOUNTER — HOSPITAL ENCOUNTER (OUTPATIENT)
Dept: RADIOLOGY | Facility: MEDICAL CENTER | Age: 31
End: 2021-12-09
Attending: FAMILY MEDICINE
Payer: COMMERCIAL

## 2021-12-09 DIAGNOSIS — N92.0 MENORRHAGIA WITH REGULAR CYCLE: ICD-10-CM

## 2021-12-09 PROCEDURE — 76830 TRANSVAGINAL US NON-OB: CPT

## 2022-01-05 PROCEDURE — RXMED WILLOW AMBULATORY MEDICATION CHARGE: Performed by: INTERNAL MEDICINE

## 2022-01-06 ENCOUNTER — PHARMACY VISIT (OUTPATIENT)
Dept: PHARMACY | Facility: MEDICAL CENTER | Age: 32
End: 2022-01-06
Payer: COMMERCIAL

## 2022-06-07 ENCOUNTER — HOSPITAL ENCOUNTER (OUTPATIENT)
Dept: LAB | Facility: MEDICAL CENTER | Age: 32
End: 2022-06-07
Attending: FAMILY MEDICINE
Payer: COMMERCIAL

## 2022-06-07 DIAGNOSIS — D50.0 IRON DEFICIENCY ANEMIA DUE TO CHRONIC BLOOD LOSS: ICD-10-CM

## 2022-06-07 LAB
BASOPHILS # BLD AUTO: 0.9 % (ref 0–1.8)
BASOPHILS # BLD: 0.04 K/UL (ref 0–0.12)
EOSINOPHIL # BLD AUTO: 0.08 K/UL (ref 0–0.51)
EOSINOPHIL NFR BLD: 1.9 % (ref 0–6.9)
ERYTHROCYTE [DISTWIDTH] IN BLOOD BY AUTOMATED COUNT: 48.1 FL (ref 35.9–50)
FERRITIN SERPL-MCNC: 6.6 NG/ML (ref 10–291)
HCT VFR BLD AUTO: 38.9 % (ref 37–47)
HGB BLD-MCNC: 12.2 G/DL (ref 12–16)
IMM GRANULOCYTES # BLD AUTO: 0.01 K/UL (ref 0–0.11)
IMM GRANULOCYTES NFR BLD AUTO: 0.2 % (ref 0–0.9)
IRON SATN MFR SERPL: 8 % (ref 15–55)
IRON SERPL-MCNC: 25 UG/DL (ref 40–170)
LYMPHOCYTES # BLD AUTO: 1.5 K/UL (ref 1–4.8)
LYMPHOCYTES NFR BLD: 34.9 % (ref 22–41)
MCH RBC QN AUTO: 26.5 PG (ref 27–33)
MCHC RBC AUTO-ENTMCNC: 31.4 G/DL (ref 33.6–35)
MCV RBC AUTO: 84.4 FL (ref 81.4–97.8)
MONOCYTES # BLD AUTO: 0.44 K/UL (ref 0–0.85)
MONOCYTES NFR BLD AUTO: 10.2 % (ref 0–13.4)
NEUTROPHILS # BLD AUTO: 2.23 K/UL (ref 2–7.15)
NEUTROPHILS NFR BLD: 51.9 % (ref 44–72)
NRBC # BLD AUTO: 0 K/UL
NRBC BLD-RTO: 0 /100 WBC
PLATELET # BLD AUTO: 141 K/UL (ref 164–446)
PMV BLD AUTO: 12.8 FL (ref 9–12.9)
RBC # BLD AUTO: 4.61 M/UL (ref 4.2–5.4)
TIBC SERPL-MCNC: 299 UG/DL (ref 250–450)
UIBC SERPL-MCNC: 274 UG/DL (ref 110–370)
WBC # BLD AUTO: 4.3 K/UL (ref 4.8–10.8)

## 2022-06-07 PROCEDURE — 82728 ASSAY OF FERRITIN: CPT

## 2022-06-07 PROCEDURE — 85025 COMPLETE CBC W/AUTO DIFF WBC: CPT

## 2022-06-07 PROCEDURE — 83540 ASSAY OF IRON: CPT

## 2022-06-07 PROCEDURE — 36415 COLL VENOUS BLD VENIPUNCTURE: CPT

## 2022-06-07 PROCEDURE — 83550 IRON BINDING TEST: CPT

## 2022-06-09 ENCOUNTER — OFFICE VISIT (OUTPATIENT)
Dept: MEDICAL GROUP | Age: 32
End: 2022-06-09
Payer: COMMERCIAL

## 2022-06-09 VITALS
OXYGEN SATURATION: 96 % | RESPIRATION RATE: 16 BRPM | DIASTOLIC BLOOD PRESSURE: 60 MMHG | SYSTOLIC BLOOD PRESSURE: 104 MMHG | HEART RATE: 67 BPM | HEIGHT: 65 IN | BODY MASS INDEX: 27.07 KG/M2 | TEMPERATURE: 98.7 F | WEIGHT: 162.5 LBS

## 2022-06-09 DIAGNOSIS — E61.1 IRON DEFICIENCY: ICD-10-CM

## 2022-06-09 DIAGNOSIS — F42.2 MIXED OBSESSIONAL THOUGHTS AND ACTS: ICD-10-CM

## 2022-06-09 DIAGNOSIS — N92.0 MENORRHAGIA WITH REGULAR CYCLE: ICD-10-CM

## 2022-06-09 DIAGNOSIS — F41.8 DEPRESSION WITH ANXIETY: ICD-10-CM

## 2022-06-09 PROBLEM — F42.9 OCD (OBSESSIVE COMPULSIVE DISORDER): Status: ACTIVE | Noted: 2022-06-09

## 2022-06-09 PROBLEM — D50.0 IRON DEFICIENCY ANEMIA DUE TO CHRONIC BLOOD LOSS: Status: RESOLVED | Noted: 2021-09-30 | Resolved: 2022-06-09

## 2022-06-09 PROCEDURE — 99214 OFFICE O/P EST MOD 30 MIN: CPT | Performed by: FAMILY MEDICINE

## 2022-06-09 RX ORDER — CITALOPRAM HYDROBROMIDE 10 MG/1
10 TABLET ORAL DAILY
Qty: 90 TABLET | Refills: 1 | Status: SHIPPED | OUTPATIENT
Start: 2022-06-09 | End: 2022-09-15 | Stop reason: SDUPTHER

## 2022-06-09 RX ORDER — LEVONORGESTREL AND ETHINYL ESTRADIOL 0.15-0.03
1 KIT ORAL DAILY
Qty: 91 TABLET | Refills: 3 | Status: SHIPPED
Start: 2022-06-09 | End: 2022-09-15

## 2022-06-09 ASSESSMENT — FIBROSIS 4 INDEX: FIB4 SCORE: 1.22

## 2022-06-09 ASSESSMENT — PATIENT HEALTH QUESTIONNAIRE - PHQ9
CLINICAL INTERPRETATION OF PHQ2 SCORE: 1
5. POOR APPETITE OR OVEREATING: 3 - NEARLY EVERY DAY

## 2022-06-09 NOTE — PROGRESS NOTES
"Subjective:   CC: Mental health, iron deficiency    HPI:     Williams Singletary is a 32 y.o. female, established patient of the clinic.     Patient is G0, P0, sexually active, has chronic menorrhagia with regular cycle.  She was previously diagnosed with iron deficiency anemia.  Patient has been taking oral iron supplement inconsistently due to severe epigastric pain with oral iron consumption.  Her most recent labs show resolution of anemia.  However, her iron deficiency is still persistent.  Patient is open to iron infusion.    Patient has been working with behavioral therapist for about 2 years.  She was diagnosed with OCD with mixed obsession with thoughts/behaviors and depression with anxiety.  Her therapist recently recommended pharmacotherapy to manage her symptoms.  Negative suicidal ideation or plans.    Current medicines (including changes today)  Current Outpatient Medications   Medication Sig Dispense Refill   • levonorgestrel-ethinyl estradiol (SEASONALE) 0.15-0.03 MG per tablet Take 1 Tablet by mouth every day. 91 Tablet 3   • citalopram (CELEXA) 10 MG tablet Take 1 Tablet by mouth every day. 90 Tablet 1   • fexofenadine (ALLEGRA) 60 MG Tab Take 60 mg by mouth every day.       No current facility-administered medications for this visit.     She  has a past medical history of Allergy, Anemia, and H/O bladder infections.    I reviewed patient's problem list, allergies, medications, family hx, social hx with patient and update EPIC.        Objective:     /60 (BP Location: Right arm, Patient Position: Sitting, BP Cuff Size: Adult)   Pulse 67   Temp 37.1 °C (98.7 °F) (Temporal)   Resp 16   Ht 1.651 m (5' 5\")   Wt 73.7 kg (162 lb 8 oz)   SpO2 96%  Body mass index is 27.04 kg/m².    Physical Exam:  Constitutional: awake, alert, in no distress.  Skin: Warm, dry, good turgor, no rashes, bruises, ulcers in visible areas.  Eye: conjunctiva clear, lids neg for edema or lesions.  Neck: Trachea midline, no " masses, no thyromegaly. No cervical or supraclavicular lymphadenopathy  Respiratory: Unlabored respiratory effort, lungs clear to auscultation, no wheezes, no rales.  Cardiovascular: Normal S1, S2, no murmur, no pedal edema.  Abdomen: Soft, non-tender to palpation, active BS, no hernia, no hepatosplenomegaly, negative rebound or guarding.   Psych: Oriented x3, affect and mood wnl, intact judgement and insight.       Assessment and Plan:   The following treatment plan was discussed    1. Menorrhagia with regular cycle  - levonorgestrel-ethinyl estradiol (SEASONALE) 0.15-0.03 MG per tablet; Take 1 Tablet by mouth every day.  Dispense: 91 Tablet; Refill: 3  -Patient is not interested in invasive devices such as IUD or Nexplanon  -She tried OCP in the past and tolerated it well.    2. Iron deficiency  Chronic iron deficiency secondary to menorrhagia with regular cycle.  Patient is not able to tolerate oral iron supplement due to severe epigastric pain.  Patient is open for iron infusion.  -We will send order to infusion center  - FERRITIN; Future  - IRON/TOTAL IRON BIND; Future  - CBC WITH DIFFERENTIAL; Future    3. Mixed obsessional thoughts and acts  4. Depression with anxiety  Chronic OCD, depression with anxiety.  Patient has been working with therapy for about 2 years.  Pharmacotherapy recommended by her therapist.  - citalopram (CELEXA) 10 MG tablet; Take 1 Tablet by mouth every day.  Dispense: 90 Tablet; Refill: 1  - side effects of Celexa discussed with patient.    -Regular exercise  -Continue to work with counseling  -Follow-up in 3 months    Kathy Ash M.D.      Followup: Return in about 3 months (around 9/9/2022) for Pap, Mental Health F/U.    Please note that this dictation was created using voice recognition software. I have made every reasonable attempt to correct obvious errors, but I expect that there are errors of grammar and possibly content that I did not discover before finalizing the note.

## 2022-06-16 RX ORDER — METHYLPREDNISOLONE SODIUM SUCCINATE 125 MG/2ML
125 INJECTION, POWDER, LYOPHILIZED, FOR SOLUTION INTRAMUSCULAR; INTRAVENOUS PRN
Status: CANCELLED | OUTPATIENT
Start: 2022-06-17

## 2022-06-16 RX ORDER — EPINEPHRINE 1 MG/ML(1)
0.5 AMPUL (ML) INJECTION PRN
Status: CANCELLED | OUTPATIENT
Start: 2022-06-17

## 2022-06-16 RX ORDER — SODIUM CHLORIDE 9 MG/ML
INJECTION, SOLUTION INTRAVENOUS CONTINUOUS
Status: CANCELLED | OUTPATIENT
Start: 2022-06-17

## 2022-06-16 RX ORDER — DIPHENHYDRAMINE HYDROCHLORIDE 50 MG/ML
50 INJECTION INTRAMUSCULAR; INTRAVENOUS PRN
Status: CANCELLED | OUTPATIENT
Start: 2022-06-17

## 2022-06-27 RX ORDER — EPINEPHRINE 1 MG/ML(1)
0.5 AMPUL (ML) INJECTION PRN
Status: CANCELLED | OUTPATIENT
Start: 2022-06-28

## 2022-06-27 RX ORDER — 0.9 % SODIUM CHLORIDE 0.9 %
3 VIAL (ML) INJECTION PRN
Status: CANCELLED | OUTPATIENT
Start: 2022-06-28

## 2022-06-27 RX ORDER — DIPHENHYDRAMINE HYDROCHLORIDE 50 MG/ML
50 INJECTION INTRAMUSCULAR; INTRAVENOUS PRN
Status: CANCELLED | OUTPATIENT
Start: 2022-06-28

## 2022-06-27 RX ORDER — 0.9 % SODIUM CHLORIDE 0.9 %
10 VIAL (ML) INJECTION PRN
Status: CANCELLED | OUTPATIENT
Start: 2022-06-28

## 2022-06-27 RX ORDER — SODIUM CHLORIDE 9 MG/ML
INJECTION, SOLUTION INTRAVENOUS CONTINUOUS
Status: CANCELLED | OUTPATIENT
Start: 2022-06-28

## 2022-06-27 RX ORDER — HEPARIN SODIUM (PORCINE) LOCK FLUSH IV SOLN 100 UNIT/ML 100 UNIT/ML
500 SOLUTION INTRAVENOUS PRN
Status: CANCELLED | OUTPATIENT
Start: 2022-06-28

## 2022-06-27 RX ORDER — 0.9 % SODIUM CHLORIDE 0.9 %
VIAL (ML) INJECTION PRN
Status: CANCELLED | OUTPATIENT
Start: 2022-06-28

## 2022-06-27 RX ORDER — METHYLPREDNISOLONE SODIUM SUCCINATE 125 MG/2ML
125 INJECTION, POWDER, LYOPHILIZED, FOR SOLUTION INTRAMUSCULAR; INTRAVENOUS PRN
Status: CANCELLED | OUTPATIENT
Start: 2022-06-28

## 2022-06-30 ENCOUNTER — OUTPATIENT INFUSION SERVICES (OUTPATIENT)
Dept: ONCOLOGY | Facility: MEDICAL CENTER | Age: 32
End: 2022-06-30
Attending: FAMILY MEDICINE
Payer: COMMERCIAL

## 2022-06-30 VITALS
SYSTOLIC BLOOD PRESSURE: 122 MMHG | RESPIRATION RATE: 18 BRPM | BODY MASS INDEX: 26.81 KG/M2 | TEMPERATURE: 97.9 F | HEIGHT: 65 IN | OXYGEN SATURATION: 97 % | DIASTOLIC BLOOD PRESSURE: 72 MMHG | HEART RATE: 76 BPM | WEIGHT: 160.94 LBS

## 2022-06-30 DIAGNOSIS — E61.1 IRON DEFICIENCY: ICD-10-CM

## 2022-06-30 PROCEDURE — 96365 THER/PROPH/DIAG IV INF INIT: CPT

## 2022-06-30 PROCEDURE — 700105 HCHG RX REV CODE 258: Performed by: FAMILY MEDICINE

## 2022-06-30 PROCEDURE — 700111 HCHG RX REV CODE 636 W/ 250 OVERRIDE (IP): Performed by: FAMILY MEDICINE

## 2022-06-30 RX ORDER — 0.9 % SODIUM CHLORIDE 0.9 %
10 VIAL (ML) INJECTION PRN
Status: CANCELLED | OUTPATIENT
Start: 2022-07-07

## 2022-06-30 RX ORDER — SODIUM CHLORIDE 9 MG/ML
INJECTION, SOLUTION INTRAVENOUS CONTINUOUS
Status: CANCELLED | OUTPATIENT
Start: 2022-07-07

## 2022-06-30 RX ORDER — EPINEPHRINE 1 MG/ML(1)
0.5 AMPUL (ML) INJECTION PRN
Status: CANCELLED | OUTPATIENT
Start: 2022-07-07

## 2022-06-30 RX ORDER — METHYLPREDNISOLONE SODIUM SUCCINATE 125 MG/2ML
125 INJECTION, POWDER, LYOPHILIZED, FOR SOLUTION INTRAMUSCULAR; INTRAVENOUS PRN
Status: CANCELLED | OUTPATIENT
Start: 2022-07-07

## 2022-06-30 RX ORDER — HEPARIN SODIUM (PORCINE) LOCK FLUSH IV SOLN 100 UNIT/ML 100 UNIT/ML
500 SOLUTION INTRAVENOUS PRN
Status: CANCELLED | OUTPATIENT
Start: 2022-07-07

## 2022-06-30 RX ORDER — 0.9 % SODIUM CHLORIDE 0.9 %
VIAL (ML) INJECTION PRN
Status: CANCELLED | OUTPATIENT
Start: 2022-07-07

## 2022-06-30 RX ORDER — SODIUM CHLORIDE 9 MG/ML
INJECTION, SOLUTION INTRAVENOUS CONTINUOUS
Status: DISCONTINUED | OUTPATIENT
Start: 2022-06-30 | End: 2022-06-30 | Stop reason: HOSPADM

## 2022-06-30 RX ORDER — 0.9 % SODIUM CHLORIDE 0.9 %
3 VIAL (ML) INJECTION PRN
Status: CANCELLED | OUTPATIENT
Start: 2022-07-07

## 2022-06-30 RX ORDER — DIPHENHYDRAMINE HYDROCHLORIDE 50 MG/ML
50 INJECTION INTRAMUSCULAR; INTRAVENOUS PRN
Status: CANCELLED | OUTPATIENT
Start: 2022-07-07

## 2022-06-30 RX ADMIN — FERUMOXYTOL 510 MG: 510 INJECTION INTRAVENOUS at 15:09

## 2022-06-30 RX ADMIN — SODIUM CHLORIDE: 9 INJECTION, SOLUTION INTRAVENOUS at 15:08

## 2022-06-30 ASSESSMENT — FIBROSIS 4 INDEX: FIB4 SCORE: 1.22

## 2022-06-30 NOTE — PROGRESS NOTES
Ms. Singletary arrived to the infusion center for ferumoxytol (Feraheme) for iron deficiency. Explained medication, procedure today, and answered all questions. PIV established to left AC without difficulty. Ferumoxytol infused with no reactions noted. PIV flushed and discontinued with tip intact. Left infusion center with no apparent distress. Confirmed appointment for next week.

## 2022-07-07 ENCOUNTER — OUTPATIENT INFUSION SERVICES (OUTPATIENT)
Dept: ONCOLOGY | Facility: MEDICAL CENTER | Age: 32
End: 2022-07-07
Attending: FAMILY MEDICINE
Payer: COMMERCIAL

## 2022-07-07 VITALS
WEIGHT: 158.95 LBS | HEIGHT: 65 IN | OXYGEN SATURATION: 96 % | RESPIRATION RATE: 18 BRPM | SYSTOLIC BLOOD PRESSURE: 112 MMHG | DIASTOLIC BLOOD PRESSURE: 72 MMHG | TEMPERATURE: 98.1 F | HEART RATE: 73 BPM | BODY MASS INDEX: 26.48 KG/M2

## 2022-07-07 DIAGNOSIS — E61.1 IRON DEFICIENCY: ICD-10-CM

## 2022-07-07 PROCEDURE — 96365 THER/PROPH/DIAG IV INF INIT: CPT

## 2022-07-07 PROCEDURE — 700105 HCHG RX REV CODE 258: Performed by: FAMILY MEDICINE

## 2022-07-07 PROCEDURE — 700111 HCHG RX REV CODE 636 W/ 250 OVERRIDE (IP): Performed by: FAMILY MEDICINE

## 2022-07-07 RX ORDER — 0.9 % SODIUM CHLORIDE 0.9 %
3 VIAL (ML) INJECTION PRN
OUTPATIENT
Start: 2022-07-07

## 2022-07-07 RX ORDER — 0.9 % SODIUM CHLORIDE 0.9 %
VIAL (ML) INJECTION PRN
OUTPATIENT
Start: 2022-07-07

## 2022-07-07 RX ORDER — 0.9 % SODIUM CHLORIDE 0.9 %
10 VIAL (ML) INJECTION PRN
OUTPATIENT
Start: 2022-07-07

## 2022-07-07 RX ORDER — METHYLPREDNISOLONE SODIUM SUCCINATE 125 MG/2ML
125 INJECTION, POWDER, LYOPHILIZED, FOR SOLUTION INTRAMUSCULAR; INTRAVENOUS PRN
OUTPATIENT
Start: 2022-07-07

## 2022-07-07 RX ORDER — DIPHENHYDRAMINE HYDROCHLORIDE 50 MG/ML
50 INJECTION INTRAMUSCULAR; INTRAVENOUS PRN
OUTPATIENT
Start: 2022-07-07

## 2022-07-07 RX ORDER — EPINEPHRINE 1 MG/ML(1)
0.5 AMPUL (ML) INJECTION PRN
OUTPATIENT
Start: 2022-07-07

## 2022-07-07 RX ORDER — HEPARIN SODIUM (PORCINE) LOCK FLUSH IV SOLN 100 UNIT/ML 100 UNIT/ML
500 SOLUTION INTRAVENOUS PRN
OUTPATIENT
Start: 2022-07-07

## 2022-07-07 RX ORDER — SODIUM CHLORIDE 9 MG/ML
INJECTION, SOLUTION INTRAVENOUS CONTINUOUS
OUTPATIENT
Start: 2022-07-07

## 2022-07-07 RX ADMIN — FERUMOXYTOL 510 MG: 510 INJECTION INTRAVENOUS at 12:13

## 2022-07-07 ASSESSMENT — FIBROSIS 4 INDEX: FIB4 SCORE: 1.22

## 2022-07-07 NOTE — PROGRESS NOTES
Pt arrived ambulatory to [facility] for week 2 of Feraheme infusion.  POC discussed.  PIV started to LAC, blood return confirmed.  Medication infused as ordered followed by 30 minute observation period.  No adverse reaction noted.  PIV flushed, removed, and site covered with gauze and coban.  No additional appointments needed at this time.  Pt discharged from [facility] in NAD under self care.

## 2022-09-13 ENCOUNTER — HOSPITAL ENCOUNTER (OUTPATIENT)
Dept: LAB | Facility: MEDICAL CENTER | Age: 32
End: 2022-09-13
Attending: FAMILY MEDICINE
Payer: COMMERCIAL

## 2022-09-13 DIAGNOSIS — E61.1 IRON DEFICIENCY: ICD-10-CM

## 2022-09-13 LAB
BASOPHILS # BLD AUTO: 0.9 % (ref 0–1.8)
BASOPHILS # BLD: 0.04 K/UL (ref 0–0.12)
EOSINOPHIL # BLD AUTO: 0.11 K/UL (ref 0–0.51)
EOSINOPHIL NFR BLD: 2.6 % (ref 0–6.9)
ERYTHROCYTE [DISTWIDTH] IN BLOOD BY AUTOMATED COUNT: 54.5 FL (ref 35.9–50)
FERRITIN SERPL-MCNC: 138 NG/ML (ref 10–291)
HCT VFR BLD AUTO: 42.4 % (ref 37–47)
HGB BLD-MCNC: 13.9 G/DL (ref 12–16)
IMM GRANULOCYTES # BLD AUTO: 0.01 K/UL (ref 0–0.11)
IMM GRANULOCYTES NFR BLD AUTO: 0.2 % (ref 0–0.9)
IRON SATN MFR SERPL: 41 % (ref 15–55)
IRON SERPL-MCNC: 89 UG/DL (ref 40–170)
LYMPHOCYTES # BLD AUTO: 1.53 K/UL (ref 1–4.8)
LYMPHOCYTES NFR BLD: 35.8 % (ref 22–41)
MCH RBC QN AUTO: 29 PG (ref 27–33)
MCHC RBC AUTO-ENTMCNC: 32.8 G/DL (ref 33.6–35)
MCV RBC AUTO: 88.3 FL (ref 81.4–97.8)
MONOCYTES # BLD AUTO: 0.39 K/UL (ref 0–0.85)
MONOCYTES NFR BLD AUTO: 9.1 % (ref 0–13.4)
NEUTROPHILS # BLD AUTO: 2.19 K/UL (ref 2–7.15)
NEUTROPHILS NFR BLD: 51.4 % (ref 44–72)
NRBC # BLD AUTO: 0 K/UL
NRBC BLD-RTO: 0 /100 WBC
PLATELET # BLD AUTO: 137 K/UL (ref 164–446)
PMV BLD AUTO: 12 FL (ref 9–12.9)
RBC # BLD AUTO: 4.8 M/UL (ref 4.2–5.4)
TIBC SERPL-MCNC: 216 UG/DL (ref 250–450)
UIBC SERPL-MCNC: 127 UG/DL (ref 110–370)
WBC # BLD AUTO: 4.3 K/UL (ref 4.8–10.8)

## 2022-09-13 PROCEDURE — 83550 IRON BINDING TEST: CPT

## 2022-09-13 PROCEDURE — 83540 ASSAY OF IRON: CPT

## 2022-09-13 PROCEDURE — 36415 COLL VENOUS BLD VENIPUNCTURE: CPT

## 2022-09-13 PROCEDURE — 82728 ASSAY OF FERRITIN: CPT

## 2022-09-13 PROCEDURE — 85025 COMPLETE CBC W/AUTO DIFF WBC: CPT

## 2022-09-15 ENCOUNTER — OFFICE VISIT (OUTPATIENT)
Dept: MEDICAL GROUP | Age: 32
End: 2022-09-15
Payer: COMMERCIAL

## 2022-09-15 VITALS
WEIGHT: 170 LBS | HEIGHT: 65 IN | HEART RATE: 63 BPM | SYSTOLIC BLOOD PRESSURE: 124 MMHG | DIASTOLIC BLOOD PRESSURE: 62 MMHG | TEMPERATURE: 96.8 F | OXYGEN SATURATION: 98 % | BODY MASS INDEX: 28.32 KG/M2

## 2022-09-15 DIAGNOSIS — E61.1 IRON DEFICIENCY: ICD-10-CM

## 2022-09-15 DIAGNOSIS — Z23 NEED FOR VACCINATION: ICD-10-CM

## 2022-09-15 DIAGNOSIS — Z00.00 PE (PHYSICAL EXAM), ANNUAL: ICD-10-CM

## 2022-09-15 DIAGNOSIS — E53.8 VITAMIN B12 DEFICIENCY: ICD-10-CM

## 2022-09-15 DIAGNOSIS — F41.8 DEPRESSION WITH ANXIETY: ICD-10-CM

## 2022-09-15 DIAGNOSIS — F42.2 MIXED OBSESSIONAL THOUGHTS AND ACTS: ICD-10-CM

## 2022-09-15 DIAGNOSIS — N92.0 MENORRHAGIA WITH REGULAR CYCLE: ICD-10-CM

## 2022-09-15 PROBLEM — Z86.2 HISTORY OF IRON DEFICIENCY ANEMIA: Status: ACTIVE | Noted: 2022-06-09

## 2022-09-15 PROCEDURE — 99214 OFFICE O/P EST MOD 30 MIN: CPT | Mod: 25 | Performed by: FAMILY MEDICINE

## 2022-09-15 PROCEDURE — 90471 IMMUNIZATION ADMIN: CPT | Performed by: FAMILY MEDICINE

## 2022-09-15 PROCEDURE — 90686 IIV4 VACC NO PRSV 0.5 ML IM: CPT | Performed by: FAMILY MEDICINE

## 2022-09-15 RX ORDER — CITALOPRAM 20 MG/1
20 TABLET ORAL DAILY
Qty: 90 TABLET | Refills: 3 | Status: SHIPPED
Start: 2022-12-01 | End: 2023-03-05

## 2022-09-15 ASSESSMENT — FIBROSIS 4 INDEX: FIB4 SCORE: 1.25

## 2022-09-15 NOTE — PROGRESS NOTES
"Subjective:   CC: ion deficiency anemia, mental health follow up     HPI:     Williams Singletary is a 32 y.o. female, established patient of the clinic.     Patient has chronic depression with anxiety and mixed obsessional thoughts and acts.  She was recently started on citalopram 10 mg daily.  She endorses significant improvement of her symptoms.  However, she wishes to try higher dose of citalopram.  She is not actively working with psychiatry or behavioral health.  Negative suicidal ideation or plans.    Patient also has chronic menorrhagia with regular cycle leading to iron deficiency anemia.  Patient received iron infusion in  and 2022.  Her most recent labs show resolution of anemia and iron deficiency.  Patient reports feeling better after iron infusion.  She was started on OCPs for menorrhagia.  Unfortunately, patient discontinue OCPs after 1.5 months due to side effects.  She is now using condoms with intercourse.  She is .  She states her menstruation is still somewhat heavy, but tolerable.  She is not interested in Nexplanon or IUDs.     She is taking vitamin B12 daily due to concerns of B12 deficiency.       Current medicines (including changes today)  Current Outpatient Medications   Medication Sig Dispense Refill    [START ON 2022] citalopram (CELEXA) 20 MG Tab Take 1 Tablet by mouth every day. 90 Tablet 3    fexofenadine (ALLEGRA) 60 MG Tab Take 60 mg by mouth every day.       No current facility-administered medications for this visit.     She  has a past medical history of Allergy, Anemia, and H/O bladder infections.    I reviewed patient's problem list, allergies, medications, family hx, social hx with patient and update EPIC.        Objective:     /62 (BP Location: Right arm, Patient Position: Sitting, BP Cuff Size: Small adult)   Pulse 63   Temp 36 °C (96.8 °F) (Temporal)   Ht 1.651 m (5' 5\")   Wt 77.1 kg (170 lb)   SpO2 98%  Body mass index is 28.29 kg/m².    Physical " Exam:  Constitutional: awake, alert, in no distress.  Skin: Warm, dry, good turgor, no rashes, bruises, ulcers in visible areas.  Neck: Trachea midline, no masses, no thyromegaly. No cervical or supraclavicular lymphadenopathy  Respiratory: Unlabored respiratory effort, lungs clear to auscultation, no wheezes, no rales.  Cardiovascular: Normal S1, S2, no murmur, no pedal edema.  Abdomen: Soft, non-tender to palpation, active BS, no hernia, no hepatosplenomegaly, negative rebound or guarding.   Psych: Oriented x3, affect and mood wnl, intact judgement and insight.       Assessment and Plan:   The following treatment plan was discussed    1. Depression with anxiety  2. Mixed obsessional thoughts and acts  Chronic, currently taking citalopram 10 mg daily.  She endorses significant improvement of symptoms but wishes to try 20 mg of citalopram daily.  Negative suicidal ideation or plans.  She is not actively working with  - citalopram (CELEXA) 20 MG Tab; Take 1 Tablet by mouth every day.  Dispense: 90 Tablet; Refill: 3  -Follow-up in 6 months    3. Iron deficiency anemia  Resolved after iron infusion in June and July 2022.  Patient is not able to tolerate oral iron supplement.  Patient reported improvement of anemia symptoms.  - CBC WITH DIFFERENTIAL; Future  - FERRITIN; Future    4. Menorrhagia with regular cycle  Chronic, leading to iron deficiency anemia as above.  Anemia has resolved with iron infusion in June and July 2022.  Her menstruation is still heavy, but tolerable.  She tried OCPs, but discontinue after 1.5 months due to side effects.  She is not interested in IUD or Nexplanon.  She declined further intervention at this point.  We will repeat labs in 6 months for reassessment.  - CBC WITH DIFFERENTIAL; Future  - FERRITIN; Future  - Follow-up in 6 months    5. Vitamin B12 deficiency  - VITAMIN B12; Future    6. Need for vaccination  - INFLUENZA VACCINE QUAD INJ (PF)        Kathy Ash M.D.      Followup:  Return in about 6 months (around 3/15/2023) for Pap, annual PE.    Please note that this dictation was created using voice recognition software. I have made every reasonable attempt to correct obvious errors, but I expect that there are errors of grammar and possibly content that I did not discover before finalizing the note.              Patient with known history of hepatitis C (unknown diagnosis date, untreated).   -Will attempt to reach out to patient's PMD to obtain additional history regarding PMHx. Patient with known history of hepatitis C (unknown diagnosis date, untreated).   -Per PCP, Hepatitis C not treated.

## 2022-10-07 ENCOUNTER — HOSPITAL ENCOUNTER (OUTPATIENT)
Facility: MEDICAL CENTER | Age: 32
End: 2022-10-07
Attending: NURSE PRACTITIONER
Payer: COMMERCIAL

## 2022-10-07 ENCOUNTER — OFFICE VISIT (OUTPATIENT)
Dept: URGENT CARE | Facility: PHYSICIAN GROUP | Age: 32
End: 2022-10-07
Payer: COMMERCIAL

## 2022-10-07 VITALS
HEART RATE: 83 BPM | BODY MASS INDEX: 26.66 KG/M2 | OXYGEN SATURATION: 96 % | HEIGHT: 65 IN | DIASTOLIC BLOOD PRESSURE: 68 MMHG | TEMPERATURE: 97.9 F | SYSTOLIC BLOOD PRESSURE: 112 MMHG | RESPIRATION RATE: 24 BRPM | WEIGHT: 160 LBS

## 2022-10-07 DIAGNOSIS — R30.0 DYSURIA: ICD-10-CM

## 2022-10-07 DIAGNOSIS — N30.01 ACUTE CYSTITIS WITH HEMATURIA: ICD-10-CM

## 2022-10-07 LAB
APPEARANCE UR: NORMAL
BILIRUB UR STRIP-MCNC: NEGATIVE MG/DL
COLOR UR AUTO: NORMAL
GLUCOSE UR STRIP.AUTO-MCNC: NEGATIVE MG/DL
INT CON NEG: NEGATIVE
INT CON POS: POSITIVE
KETONES UR STRIP.AUTO-MCNC: NEGATIVE MG/DL
LEUKOCYTE ESTERASE UR QL STRIP.AUTO: NEGATIVE
NITRITE UR QL STRIP.AUTO: NEGATIVE
PH UR STRIP.AUTO: 5.5 [PH] (ref 5–8)
POC URINE PREGNANCY TEST: NORMAL
PROT UR QL STRIP: NEGATIVE MG/DL
RBC UR QL AUTO: NORMAL
SP GR UR STRIP.AUTO: 1.03
UROBILINOGEN UR STRIP-MCNC: 0.2 MG/DL

## 2022-10-07 PROCEDURE — 81025 URINE PREGNANCY TEST: CPT | Performed by: NURSE PRACTITIONER

## 2022-10-07 PROCEDURE — 87086 URINE CULTURE/COLONY COUNT: CPT

## 2022-10-07 PROCEDURE — 99213 OFFICE O/P EST LOW 20 MIN: CPT | Performed by: NURSE PRACTITIONER

## 2022-10-07 PROCEDURE — 81002 URINALYSIS NONAUTO W/O SCOPE: CPT | Performed by: NURSE PRACTITIONER

## 2022-10-07 RX ORDER — CEFDINIR 300 MG/1
300 CAPSULE ORAL EVERY 12 HOURS
Qty: 10 CAPSULE | Refills: 0 | Status: SHIPPED | OUTPATIENT
Start: 2022-10-07 | End: 2022-10-12

## 2022-10-07 ASSESSMENT — ENCOUNTER SYMPTOMS
NAUSEA: 0
FEVER: 0
CONSTIPATION: 0
MYALGIAS: 0
CHILLS: 0
DIARRHEA: 0
FLANK PAIN: 1
HEADACHES: 0
DIZZINESS: 0
ABDOMINAL PAIN: 0
WEAKNESS: 0
BACK PAIN: 0
VOMITING: 0

## 2022-10-07 ASSESSMENT — FIBROSIS 4 INDEX: FIB4 SCORE: 1.25

## 2022-10-07 NOTE — PROGRESS NOTES
Subjective     Williams Singletary is a 32 y.o. female who presents with UTI (Pain with urination, right slide flank, pressure- 2x day )            UTI  Pertinent negatives include no abdominal pain, chills, fever, headaches, myalgias, nausea, rash, vomiting or weakness.  has been experiencing dysuria left side flank pain with lower pelvic pressure x2 days.  Denies vaginal discharge or itching.  Denies fever, nausea, vomiting or diarrhea.   has not had a urinary tract infection in 2 years but did have history of frequent urinary tract infections.  Denies hematuria.    PMH:  has a past medical history of Allergy, Anemia, and H/O bladder infections.  MEDS:   Current Outpatient Medications:     cefdinir (OMNICEF) 300 MG Cap, Take 1 Capsule by mouth every 12 hours for 5 days., Disp: 10 Capsule, Rfl: 0    [START ON 12/1/2022] citalopram (CELEXA) 20 MG Tab, Take 1 Tablet by mouth every day., Disp: 90 Tablet, Rfl: 3    fexofenadine (ALLEGRA) 60 MG Tab, Take 60 mg by mouth every day., Disp: , Rfl:   ALLERGIES: No Known Allergies  SURGHX:   Past Surgical History:   Procedure Laterality Date    APPENDECTOMY      at age 13     SOCHX:  reports that she has never smoked. She has never used smokeless tobacco. She reports current alcohol use of about 0.6 oz per week. She reports that she does not use drugs.  FH: Family history was reviewed, no pertinent findings to report      Review of Systems   Constitutional:  Negative for chills, fever and malaise/fatigue.   Gastrointestinal:  Negative for abdominal pain, constipation, diarrhea, nausea and vomiting.   Genitourinary:  Positive for dysuria and flank pain. Negative for frequency, hematuria and urgency.   Musculoskeletal:  Negative for back pain and myalgias.   Skin:  Negative for itching and rash.   Neurological:  Negative for dizziness, weakness and headaches.   All other systems reviewed and are negative.           Objective     /68 (BP Location: Right arm,  "Patient Position: Sitting, BP Cuff Size: Adult)   Pulse 83   Temp 36.6 °C (97.9 °F) (Temporal)   Resp (!) 24   Ht 1.651 m (5' 5\")   Wt 72.6 kg (160 lb)   SpO2 96%   BMI 26.63 kg/m²      Physical Exam  Vitals reviewed.   Constitutional:       General: She is awake. She is not in acute distress.     Appearance: Normal appearance. She is well-developed. She is not ill-appearing, toxic-appearing or diaphoretic.   HENT:      Head: Normocephalic.   Cardiovascular:      Rate and Rhythm: Normal rate.   Pulmonary:      Effort: Pulmonary effort is normal.   Abdominal:      General: Bowel sounds are normal. There is no distension.      Palpations: Abdomen is soft.      Tenderness: There is no abdominal tenderness. There is no guarding or rebound.   Musculoskeletal:         General: Normal range of motion.   Skin:     General: Skin is warm and dry.   Neurological:      Mental Status: She is alert and oriented to person, place, and time.   Psychiatric:         Attention and Perception: Attention normal.         Mood and Affect: Mood normal.         Speech: Speech normal.         Behavior: Behavior normal. Behavior is cooperative.                           Assessment & Plan        1. Acute cystitis with hematuria    - cefdinir (OMNICEF) 300 MG Cap; Take 1 Capsule by mouth every 12 hours for 5 days.  Dispense: 10 Capsule; Refill: 0  - Urine Culture; Future    2. Dysuria    - POCT Urinalysis  - POCT PREGNANCY     -Increase water intake  -Urinate more frequently and empty bladder completely  -Practice good toileting hygiene after bowel movements and sexual intercourse, refrain from sexual intercourse until infection cleared  -Monitor for back/flank pain, difficulty urinating, blood in urine- need re-evaluation       MyChart release of urine culture results        "

## 2022-10-10 LAB
BACTERIA UR CULT: NORMAL
SIGNIFICANT IND 70042: NORMAL
SITE SITE: NORMAL
SOURCE SOURCE: NORMAL

## 2022-12-05 ENCOUNTER — TELEPHONE (OUTPATIENT)
Dept: MEDICAL GROUP | Age: 32
End: 2022-12-05
Payer: COMMERCIAL

## 2022-12-06 NOTE — TELEPHONE ENCOUNTER
Phone Number Called: [phone] (home)       Call outcome: Left detailed message for patient. Informed to call back with any additional questions.    Message: Informed pt that rx is ready to be picked up.

## 2022-12-06 NOTE — TELEPHONE ENCOUNTER
I called pt's pharmacy to verify citalopram 20mg rx is good to go. They said they got the script and it will be ready for patient.

## 2023-03-01 ENCOUNTER — PATIENT MESSAGE (OUTPATIENT)
Dept: MEDICAL GROUP | Age: 33
End: 2023-03-01
Payer: COMMERCIAL

## 2023-03-01 DIAGNOSIS — Z11.59 NEED FOR HEPATITIS C SCREENING TEST: ICD-10-CM

## 2023-03-01 DIAGNOSIS — F41.8 DEPRESSION WITH ANXIETY: ICD-10-CM

## 2023-03-05 RX ORDER — CITALOPRAM HYDROBROMIDE 10 MG/1
10 TABLET ORAL DAILY
Qty: 30 TABLET | Refills: 0 | Status: SHIPPED
Start: 2023-03-05 | End: 2023-09-29

## 2023-03-27 ENCOUNTER — HOSPITAL ENCOUNTER (OUTPATIENT)
Dept: LAB | Facility: MEDICAL CENTER | Age: 33
End: 2023-03-27
Attending: FAMILY MEDICINE
Payer: COMMERCIAL

## 2023-03-27 DIAGNOSIS — E61.1 IRON DEFICIENCY: ICD-10-CM

## 2023-03-27 DIAGNOSIS — Z00.00 PE (PHYSICAL EXAM), ANNUAL: ICD-10-CM

## 2023-03-27 DIAGNOSIS — Z11.59 NEED FOR HEPATITIS C SCREENING TEST: ICD-10-CM

## 2023-03-27 DIAGNOSIS — N92.0 MENORRHAGIA WITH REGULAR CYCLE: ICD-10-CM

## 2023-03-27 DIAGNOSIS — E53.8 VITAMIN B12 DEFICIENCY: ICD-10-CM

## 2023-03-27 LAB
ALBUMIN SERPL BCP-MCNC: 4.3 G/DL (ref 3.2–4.9)
ALBUMIN/GLOB SERPL: 1.7 G/DL
ALP SERPL-CCNC: 132 U/L (ref 30–99)
ALT SERPL-CCNC: 46 U/L (ref 2–50)
ANION GAP SERPL CALC-SCNC: 8 MMOL/L (ref 7–16)
AST SERPL-CCNC: 58 U/L (ref 12–45)
BASOPHILS # BLD AUTO: 1 % (ref 0–1.8)
BASOPHILS # BLD: 0.04 K/UL (ref 0–0.12)
BILIRUB SERPL-MCNC: 0.3 MG/DL (ref 0.1–1.5)
BUN SERPL-MCNC: 11 MG/DL (ref 8–22)
CALCIUM ALBUM COR SERPL-MCNC: 8.9 MG/DL (ref 8.5–10.5)
CALCIUM SERPL-MCNC: 9.1 MG/DL (ref 8.4–10.2)
CHLORIDE SERPL-SCNC: 105 MMOL/L (ref 96–112)
CHOLEST SERPL-MCNC: 171 MG/DL (ref 100–199)
CO2 SERPL-SCNC: 23 MMOL/L (ref 20–33)
CREAT SERPL-MCNC: 0.56 MG/DL (ref 0.5–1.4)
EOSINOPHIL # BLD AUTO: 0.07 K/UL (ref 0–0.51)
EOSINOPHIL NFR BLD: 1.8 % (ref 0–6.9)
ERYTHROCYTE [DISTWIDTH] IN BLOOD BY AUTOMATED COUNT: 43.8 FL (ref 35.9–50)
EST. AVERAGE GLUCOSE BLD GHB EST-MCNC: 94 MG/DL
FASTING STATUS PATIENT QL REPORTED: NORMAL
FERRITIN SERPL-MCNC: 22.1 NG/ML (ref 10–291)
GFR SERPLBLD CREATININE-BSD FMLA CKD-EPI: 124 ML/MIN/1.73 M 2
GLOBULIN SER CALC-MCNC: 2.6 G/DL (ref 1.9–3.5)
GLUCOSE SERPL-MCNC: 86 MG/DL (ref 65–99)
HBA1C MFR BLD: 4.9 % (ref 4–5.6)
HCT VFR BLD AUTO: 40.8 % (ref 37–47)
HCV AB SER QL: NORMAL
HDLC SERPL-MCNC: 66 MG/DL
HGB BLD-MCNC: 13.3 G/DL (ref 12–16)
IMM GRANULOCYTES # BLD AUTO: 0.02 K/UL (ref 0–0.11)
IMM GRANULOCYTES NFR BLD AUTO: 0.5 % (ref 0–0.9)
LDLC SERPL CALC-MCNC: 96 MG/DL
LYMPHOCYTES # BLD AUTO: 1.21 K/UL (ref 1–4.8)
LYMPHOCYTES NFR BLD: 31.6 % (ref 22–41)
MCH RBC QN AUTO: 29.1 PG (ref 27–33)
MCHC RBC AUTO-ENTMCNC: 32.6 G/DL (ref 33.6–35)
MCV RBC AUTO: 89.3 FL (ref 81.4–97.8)
MONOCYTES # BLD AUTO: 0.37 K/UL (ref 0–0.85)
MONOCYTES NFR BLD AUTO: 9.7 % (ref 0–13.4)
NEUTROPHILS # BLD AUTO: 2.12 K/UL (ref 2–7.15)
NEUTROPHILS NFR BLD: 55.4 % (ref 44–72)
NRBC # BLD AUTO: 0 K/UL
NRBC BLD-RTO: 0 /100 WBC
PLATELET # BLD AUTO: 141 K/UL (ref 164–446)
PMV BLD AUTO: 12.4 FL (ref 9–12.9)
POTASSIUM SERPL-SCNC: 4.2 MMOL/L (ref 3.6–5.5)
PROT SERPL-MCNC: 6.9 G/DL (ref 6–8.2)
RBC # BLD AUTO: 4.57 M/UL (ref 4.2–5.4)
SODIUM SERPL-SCNC: 136 MMOL/L (ref 135–145)
TRIGL SERPL-MCNC: 44 MG/DL (ref 0–149)
VIT B12 SERPL-MCNC: 259 PG/ML (ref 211–911)
WBC # BLD AUTO: 3.8 K/UL (ref 4.8–10.8)

## 2023-03-27 PROCEDURE — 36415 COLL VENOUS BLD VENIPUNCTURE: CPT

## 2023-03-27 PROCEDURE — 83036 HEMOGLOBIN GLYCOSYLATED A1C: CPT

## 2023-03-27 PROCEDURE — 80053 COMPREHEN METABOLIC PANEL: CPT

## 2023-03-27 PROCEDURE — 82728 ASSAY OF FERRITIN: CPT

## 2023-03-27 PROCEDURE — 82607 VITAMIN B-12: CPT

## 2023-03-27 PROCEDURE — 85025 COMPLETE CBC W/AUTO DIFF WBC: CPT

## 2023-03-27 PROCEDURE — 80061 LIPID PANEL: CPT

## 2023-03-27 PROCEDURE — 86803 HEPATITIS C AB TEST: CPT

## 2023-03-30 ENCOUNTER — HOSPITAL ENCOUNTER (OUTPATIENT)
Facility: MEDICAL CENTER | Age: 33
End: 2023-03-30
Attending: FAMILY MEDICINE
Payer: COMMERCIAL

## 2023-03-30 ENCOUNTER — OFFICE VISIT (OUTPATIENT)
Dept: MEDICAL GROUP | Age: 33
End: 2023-03-30
Payer: COMMERCIAL

## 2023-03-30 VITALS
OXYGEN SATURATION: 97 % | TEMPERATURE: 99.1 F | HEART RATE: 94 BPM | DIASTOLIC BLOOD PRESSURE: 62 MMHG | HEIGHT: 65 IN | WEIGHT: 173.6 LBS | BODY MASS INDEX: 28.92 KG/M2 | SYSTOLIC BLOOD PRESSURE: 116 MMHG

## 2023-03-30 DIAGNOSIS — G89.29 CHRONIC PAIN OF BOTH KNEES: ICD-10-CM

## 2023-03-30 DIAGNOSIS — F41.8 DEPRESSION WITH ANXIETY: ICD-10-CM

## 2023-03-30 DIAGNOSIS — Z11.51 SPECIAL SCREENING EXAMINATION FOR HUMAN PAPILLOMAVIRUS (HPV): ICD-10-CM

## 2023-03-30 DIAGNOSIS — J01.40 ACUTE NON-RECURRENT PANSINUSITIS: ICD-10-CM

## 2023-03-30 DIAGNOSIS — M25.562 CHRONIC PAIN OF BOTH KNEES: ICD-10-CM

## 2023-03-30 DIAGNOSIS — F42.2 MIXED OBSESSIONAL THOUGHTS AND ACTS: ICD-10-CM

## 2023-03-30 DIAGNOSIS — M25.561 CHRONIC PAIN OF BOTH KNEES: ICD-10-CM

## 2023-03-30 DIAGNOSIS — Z01.419 ENCOUNTER FOR WELL WOMAN EXAM WITH ROUTINE GYNECOLOGICAL EXAM: ICD-10-CM

## 2023-03-30 DIAGNOSIS — Z01.419 WELL WOMAN EXAM: Primary | ICD-10-CM

## 2023-03-30 DIAGNOSIS — Z11.3 SCREEN FOR STD (SEXUALLY TRANSMITTED DISEASE): ICD-10-CM

## 2023-03-30 DIAGNOSIS — J30.2 SEASONAL ALLERGIES: ICD-10-CM

## 2023-03-30 DIAGNOSIS — Z12.4 ROUTINE CERVICAL SMEAR: ICD-10-CM

## 2023-03-30 LAB — AMBIGUOUS DTTM AMBI4: NORMAL

## 2023-03-30 PROCEDURE — 87624 HPV HI-RISK TYP POOLED RSLT: CPT

## 2023-03-30 PROCEDURE — 88175 CYTOPATH C/V AUTO FLUID REDO: CPT

## 2023-03-30 PROCEDURE — 87491 CHLMYD TRACH DNA AMP PROBE: CPT

## 2023-03-30 PROCEDURE — 99395 PREV VISIT EST AGE 18-39: CPT | Performed by: FAMILY MEDICINE

## 2023-03-30 PROCEDURE — 87591 N.GONORRHOEAE DNA AMP PROB: CPT

## 2023-03-30 PROCEDURE — 99214 OFFICE O/P EST MOD 30 MIN: CPT | Mod: 25 | Performed by: FAMILY MEDICINE

## 2023-03-30 RX ORDER — METHYLPREDNISOLONE 4 MG/1
TABLET ORAL
Qty: 21 TABLET | Refills: 0 | Status: SHIPPED
Start: 2023-03-30 | End: 2023-09-29

## 2023-03-30 RX ORDER — AZITHROMYCIN 250 MG/1
TABLET, FILM COATED ORAL
Qty: 6 TABLET | Refills: 0 | Status: SHIPPED | OUTPATIENT
Start: 2023-03-30 | End: 2023-04-04

## 2023-03-30 ASSESSMENT — FIBROSIS 4 INDEX: FIB4 SCORE: 1.88

## 2023-03-30 ASSESSMENT — PATIENT HEALTH QUESTIONNAIRE - PHQ9: CLINICAL INTERPRETATION OF PHQ2 SCORE: 0

## 2023-03-30 NOTE — PROGRESS NOTES
Subjective:   CC: WWE, pap, bilateral knee pain, depression/anxiety, sinusitis.     HPI:     Williams Singletary is a 32 y.o. female, established patient of the clinic.     , sexually acitve with male partner.   Contraception: none, trying to conceive   Hx of STD: negative   Hx of abnormal pap: negative   Patient's last menstrual period was 2023., slightly regular, normal flow, minimal cramping.   Denies fever, chills, pelvic pain, dyspareunia, abnormal vaginal bleeding/discharge, genital rash.   Denies nipple bleeding, discharge, breast mass, familial/personal hx of breast/gyn malignancy.     Chronic bilateral knee pain for about 1 year  Pain is behind the patella.   Pain is worse with cold weather or increased physical activities.   Pain is not worse in the morning.   Patient used to be runner in high school.   History of knee injuries during high school.   Negative history of knee surgery.     She also complained of frontal headache, bilateral sinus pressure/pain for several days. She is taking nasal steroid for allergic rhinitis. Over-the-counter medications were partially helpful.   She tested negative for Covid at work.     She has chronic depression/anxiety and mixed OCD. She was doing well with Citalopram, but wishes to stop the medication because it is not as helpful.        Current medicines (including changes today)  Current Outpatient Medications   Medication Sig Dispense Refill    methylPREDNISolone (MEDROL DOSEPAK) 4 MG Tablet Therapy Pack 6 tabs day 1, 5 tabs day 2, 4 tabs day 3, 3 tabs day 4, 2 tabs day 5, 1 tab day 6. Take with food. 21 Tablet 0    azithromycin (ZITHROMAX) 250 MG Tab Take 2 tablets on the first day and 1 tablet daily thereafter for 4 days. 6 Tablet 0    citalopram (CELEXA) 10 MG tablet Take 1 Tablet by mouth every day. 30 Tablet 0    fexofenadine (ALLEGRA) 60 MG Tab Take 60 mg by mouth every day.       No current facility-administered medications for this visit.     She  has a  "past medical history of Allergy, Anemia, and H/O bladder infections.    I reviewed patient's problem list, allergies, medications, family hx, social hx with patient and update EPIC.        Objective:     /62 (BP Location: Right arm, Patient Position: Sitting, BP Cuff Size: Adult)   Pulse 94   Temp 37.3 °C (99.1 °F) (Temporal)   Ht 1.651 m (5' 5\")   Wt 78.7 kg (173 lb 9.6 oz)   SpO2 97%  Body mass index is 28.89 kg/m².    Physical Exam:  Constitutional: awake, alert, in no distress.  Skin: Warm, dry, good turgor, no rashes, bruises, ulcers in visible areas.  Eye: conjunctiva clear, lids neg for edema or lesions.  ENMT: TM and auditory canals wnl. Pale and congested nasal mucosa with inferior nasal turbinate hypertrophy bilaterally. Lips without lesions, good dentition, cobblestone posterior pharynx.  Neck: Trachea midline, no masses, no thyromegaly. No cervical or supraclavicular lymphadenopathy  Respiratory: Unlabored respiratory effort, lungs clear to auscultation, no wheezes, no rales.  Cardiovascular: Normal S1, S2, no murmur, no pedal edema.  Psych: Oriented x3, affect and mood wnl, intact judgement and insight.   GYN: Unremarkable external genitalia. Negative abnormal vaginal discharge or bleeding, no vaginal rash, cervix in mid position, no concerning lesions on cervix, no cervical motion tenderness, uterus mid position with normal size & shape, no adnexal fullness/mass appreciated on bimanual exam.   MSK: bilateral knee exam within normal limits     Assessment and Plan:   The following treatment plan was discussed    1. Routine cervical smear  - THINPREP PAP W/HPV AND CTNG    2. Encounter for well woman exam with routine gynecological exam  - THINPREP PAP W/HPV AND CTNG    3. Special screening examination for human papillomavirus (HPV)  - THINPREP PAP W/HPV AND CTNG    4. Well woman exam  Labs per orders  Patient was counseled about skin care, diet, supplements, exercises.   Preventive cares " reviewed, discussed, and updated as appropriate.       5. Depression with anxiety  6. Mixed obsessional thoughts and acts  Chronic, was doing well with Citalopram 20 mg and .   She wishes to discontinue medication at this time.  Weaning instructions provided  Continue to work with   Follow up PRN     7. Screen for STD (sexually transmitted disease)  - THINPREP PAP W/HPV AND CTNG  - T.PALLIDUM AB LAUREL (SCREENING); Future  - HIV AG/AB COMBO ASSAY SCREENING; Future    8. Chronic pain of both knees  - maintain healthy body weight  - avoid knee trauma   - home rehab exercise handout provided   - over-the-counter analgesics PRN for pain.   - DX-KNEE COMPLETE 4+ LEFT; Future  - DX-KNEE COMPLETE 4+ RIGHT; Future    9. Acute non-recurrent pansinusitis  History and exam are concerning for acute sinusitis.   - methylPREDNISolone (MEDROL DOSEPAK) 4 MG Tablet Therapy Pack; 6 tabs day 1, 5 tabs day 2, 4 tabs day 3, 3 tabs day 4, 2 tabs day 5, 1 tab day 6. Take with food.  Dispense: 21 Tablet; Refill: 0  - azithromycin (ZITHROMAX) 250 MG Tab; Take 2 tablets on the first day and 1 tablet daily thereafter for 4 days.  Dispense: 6 Tablet; Refill: 0   - nasal saline irrigation     10. Seasonal allergies   - Nasal saline irrigation 2-3 times per day to reduce allergen load  - Over-the-counter nasal steroid (Flonase, NasoNex, or Nasacort)   - Over-the-counter oral anti-histamine PRN (Claritin, Allegra, or Zyrtec)    Kathy Ash M.D.      Followup: Return in 1 year (on 3/30/2024) for annual PE.    Please note that this dictation was created using voice recognition software. I have made every reasonable attempt to correct obvious errors, but I expect that there are errors of grammar and possibly content that I did not discover before finalizing the note.

## 2023-03-30 NOTE — PATIENT INSTRUCTIONS
Irrigate your nose once daily       2 sprays per nostril once daily after sinus rinse above        Take Allegra 180 mg, 1 pill daily

## 2023-03-31 LAB
C TRACH DNA GENITAL QL NAA+PROBE: NEGATIVE
CYTOLOGY REG CYTOL: NORMAL
HPV HR 12 DNA CVX QL NAA+PROBE: NEGATIVE
HPV16 DNA SPEC QL NAA+PROBE: NEGATIVE
HPV18 DNA SPEC QL NAA+PROBE: NEGATIVE
N GONORRHOEA DNA GENITAL QL NAA+PROBE: NEGATIVE
SPECIMEN SOURCE: NORMAL
SPECIMEN SOURCE: NORMAL

## 2023-04-06 ENCOUNTER — APPOINTMENT (OUTPATIENT)
Dept: RADIOLOGY | Facility: MEDICAL CENTER | Age: 33
End: 2023-04-06
Attending: FAMILY MEDICINE
Payer: COMMERCIAL

## 2023-04-14 ENCOUNTER — APPOINTMENT (OUTPATIENT)
Dept: RADIOLOGY | Facility: MEDICAL CENTER | Age: 33
End: 2023-04-14
Attending: FAMILY MEDICINE
Payer: COMMERCIAL

## 2023-04-14 DIAGNOSIS — M25.561 CHRONIC PAIN OF BOTH KNEES: ICD-10-CM

## 2023-04-14 DIAGNOSIS — M25.562 CHRONIC PAIN OF BOTH KNEES: ICD-10-CM

## 2023-04-14 DIAGNOSIS — G89.29 CHRONIC PAIN OF BOTH KNEES: ICD-10-CM

## 2023-04-14 PROCEDURE — 73564 X-RAY EXAM KNEE 4 OR MORE: CPT | Mod: LT

## 2023-04-14 PROCEDURE — 73564 X-RAY EXAM KNEE 4 OR MORE: CPT | Mod: RT

## 2023-05-08 ENCOUNTER — OFFICE VISIT (OUTPATIENT)
Dept: URGENT CARE | Facility: PHYSICIAN GROUP | Age: 33
End: 2023-05-08
Payer: COMMERCIAL

## 2023-05-08 VITALS
SYSTOLIC BLOOD PRESSURE: 122 MMHG | DIASTOLIC BLOOD PRESSURE: 64 MMHG | HEART RATE: 72 BPM | WEIGHT: 177.4 LBS | HEIGHT: 65 IN | OXYGEN SATURATION: 98 % | RESPIRATION RATE: 12 BRPM | BODY MASS INDEX: 29.56 KG/M2 | TEMPERATURE: 98.1 F

## 2023-05-08 DIAGNOSIS — S29.019A THORACIC MYOFASCIAL STRAIN, INITIAL ENCOUNTER: ICD-10-CM

## 2023-05-08 PROCEDURE — 99213 OFFICE O/P EST LOW 20 MIN: CPT | Performed by: STUDENT IN AN ORGANIZED HEALTH CARE EDUCATION/TRAINING PROGRAM

## 2023-05-08 RX ORDER — METHOCARBAMOL 500 MG/1
TABLET, FILM COATED ORAL
Qty: 30 TABLET | Refills: 0 | Status: SHIPPED
Start: 2023-05-08 | End: 2023-09-29

## 2023-05-08 RX ORDER — KETOROLAC TROMETHAMINE 30 MG/ML
30 INJECTION, SOLUTION INTRAMUSCULAR; INTRAVENOUS ONCE
Status: COMPLETED | OUTPATIENT
Start: 2023-05-08 | End: 2023-05-08

## 2023-05-08 RX ORDER — IBUPROFEN 800 MG/1
800 TABLET ORAL EVERY 8 HOURS PRN
Qty: 30 TABLET | Refills: 0 | Status: SHIPPED | OUTPATIENT
Start: 2023-05-08

## 2023-05-08 RX ADMIN — KETOROLAC TROMETHAMINE 30 MG: 30 INJECTION, SOLUTION INTRAMUSCULAR; INTRAVENOUS at 18:02

## 2023-05-08 ASSESSMENT — FIBROSIS 4 INDEX: FIB4 SCORE: 1.94

## 2023-05-08 NOTE — LETTER
May 8, 2023       Patient: Williams Singletary   YOB: 1990   Date of Visit: 5/8/2023         To Whom It May Concern:    Williams Singletary was seen in urgent care and excused from work today.     If you have any questions or concerns, please don't hesitate to call [phone]          Sincerely,          Yariel Shukla D.O.  Electronically Signed

## 2023-05-09 NOTE — PROGRESS NOTES
Subjective:   CHIEF COMPLAINT  Chief Complaint   Patient presents with    Back Pain     Pain radiates up and down back, constant pain, x1 week        HPI  Williams Singletary is a 33 y.o. female who presents with a chief complaint of thoracic back pain which developed 1 week ago.  Says the symptoms are aggravated with extended periods of sitting while at work.  Says symptoms improve when not sitting.  She is tried Advil, IcyHot and topical CBD which have provided only transient relief of symptoms.  Describes as achy and at times sharp discomfort.  No radiculopathy into bilateral upper extremities.  No trauma or injury to the region.  No dysuria or hematuria.  No fevers, chills, cough or cold-like symptoms.    REVIEW OF SYSTEMS  General: no fever or chills  GI: no nausea or vomiting  See HPI for further details.    PAST MEDICAL HISTORY   has a past medical history of Allergy, Anemia, and H/O bladder infections.    SURGICAL HISTORY   has a past surgical history that includes appendectomy.    ALLERGIES  No Known Allergies    CURRENT MEDICATIONS  Home Medications       Reviewed by Néstor Nguyễn't (Medical Assistant) on 05/08/23 at 1704  Med List Status: <None>     Medication Last Dose Status   citalopram (CELEXA) 10 MG tablet Taking Active   fexofenadine (ALLEGRA) 60 MG Tab PRN Active   methylPREDNISolone (MEDROL DOSEPAK) 4 MG Tablet Therapy Pack Taking Flagged for Removal                    SOCIAL HISTORY  Social History     Tobacco Use    Smoking status: Never    Smokeless tobacco: Never   Vaping Use    Vaping Use: Never used   Substance and Sexual Activity    Alcohol use: Not Currently     Alcohol/week: 0.6 oz     Types: 1 Glasses of wine per week    Drug use: No    Sexual activity: Yes     Partners: Male     Birth control/protection: Condom     Comment: PAR in ED       FAMILY HISTORY  Family History   Problem Relation Age of Onset    Lung Disease Father         COPD    Heart Disease Maternal Grandfather 73     "    Heart Attack    No Known Problems Mother     No Known Problems Sister           Objective:   PHYSICAL EXAM  VITAL SIGNS: /64 (BP Location: Right arm, Patient Position: Sitting, BP Cuff Size: Adult)   Pulse 72   Temp 36.7 °C (98.1 °F) (Temporal)   Resp 12   Ht 1.651 m (5' 5\")   Wt 80.5 kg (177 lb 6.4 oz)   SpO2 98%   BMI 29.52 kg/m²     Gen: no acute distress, normal voice  Skin: dry, intact, moist mucosal membranes  Eyes: No conjunctival injection b/l  Neck: Normal range of motion. No meningeal signs.   Lungs: No increased work of breathing.  CTAB w/ symmetric expansion  CV: RRR w/o murmurs or clicks  MSK: Thoracic spine: No erythema, ecchymosis or edema.  No focal tenderness to palpation along the midline thorax, ribs or adjacent soft tissue.  Distal sensation and motor fully intact bilateral upper extremities.    Assessment/Plan:     1. Thoracic myofascial strain, initial encounter  Referral to Physical Therapy    ketorolac (TORADOL) injection 30 mg    ibuprofen (MOTRIN) 800 MG Tab    methocarbamol (ROBAXIN) 500 MG Tab      -Toradol 30 mg IM x1 given the clinic.  No additional NSAIDs for 8 hours  -Ordered Motrin   -Ordered Robaxin.  Side effects discussed  -Ordered referral to physical therapy  -Recommended requesting a standing desk to be used while at work  -Encouraged light activity as tolerated  - Return to urgent care any new/worsening symptoms or further questions or concerns.  Patient understood everything discussed.  All questions were answered.      Differential diagnosis, natural history, supportive care, and indications for immediate follow-up discussed. All questions answered. Patient agrees with the plan of care.    Follow-up as needed if symptoms worsen or fail to improve to PCP, Urgent care or Emergency Room.    Please note that this dictation was created using voice recognition software. I have made a reasonable attempt to correct obvious errors, but I expect that there are errors " of grammar and possibly content that I did not discover before finalizing the note.

## 2023-09-29 ENCOUNTER — OFFICE VISIT (OUTPATIENT)
Dept: URGENT CARE | Facility: PHYSICIAN GROUP | Age: 33
End: 2023-09-29
Payer: COMMERCIAL

## 2023-09-29 VITALS
DIASTOLIC BLOOD PRESSURE: 80 MMHG | SYSTOLIC BLOOD PRESSURE: 120 MMHG | HEIGHT: 65 IN | OXYGEN SATURATION: 94 % | BODY MASS INDEX: 29.49 KG/M2 | WEIGHT: 177 LBS | TEMPERATURE: 98 F | RESPIRATION RATE: 16 BRPM | HEART RATE: 95 BPM

## 2023-09-29 DIAGNOSIS — N93.9 EPISODE OF HEAVY VAGINAL BLEEDING: ICD-10-CM

## 2023-09-29 LAB
APPEARANCE UR: CLEAR
BILIRUB UR STRIP-MCNC: NEGATIVE MG/DL
COLOR UR AUTO: NORMAL
GLUCOSE UR STRIP.AUTO-MCNC: NEGATIVE MG/DL
KETONES UR STRIP.AUTO-MCNC: 40 MG/DL
LEUKOCYTE ESTERASE UR QL STRIP.AUTO: NEGATIVE
NITRITE UR QL STRIP.AUTO: NEGATIVE
PH UR STRIP.AUTO: 5.5 [PH] (ref 5–8)
POCT INT CON NEG: NEGATIVE
POCT INT CON POS: POSITIVE
POCT URINE PREGNANCY TEST: NEGATIVE
PROT UR QL STRIP: NEGATIVE MG/DL
RBC UR QL AUTO: NORMAL
SP GR UR STRIP.AUTO: >=1.03
UROBILINOGEN UR STRIP-MCNC: 0.2 MG/DL

## 2023-09-29 PROCEDURE — 99213 OFFICE O/P EST LOW 20 MIN: CPT | Performed by: NURSE PRACTITIONER

## 2023-09-29 PROCEDURE — 81002 URINALYSIS NONAUTO W/O SCOPE: CPT | Performed by: NURSE PRACTITIONER

## 2023-09-29 PROCEDURE — 3074F SYST BP LT 130 MM HG: CPT | Performed by: NURSE PRACTITIONER

## 2023-09-29 PROCEDURE — 3079F DIAST BP 80-89 MM HG: CPT | Performed by: NURSE PRACTITIONER

## 2023-09-29 PROCEDURE — 81025 URINE PREGNANCY TEST: CPT | Performed by: NURSE PRACTITIONER

## 2023-09-29 ASSESSMENT — FIBROSIS 4 INDEX: FIB4 SCORE: 1.94

## 2023-09-29 NOTE — PROGRESS NOTES
"Subjective:   Williams Singletary is a 33 y.o. female who presents for Menstrual Problem (Took plan B on Friday, heavy bleeding since Saturday. Hx of anemia. Feeling headache. /Last period lasted 10 days (first day 9/12/23-9/22/23))    Patient is a 33-year-old female presenting to clinic today with increased vaginal bleeding with occasional clots that started 6 days ago (09/23/2023).  Patient did take a Plan B pill on 09/22/2023.  She states that her vaginal bleeding is worse first thing in the morning and right before bed and is lighter throughout the day.  Associated symptoms include mild abdominal cramping.  She denies any dizziness, palpitations, chest pain, or headaches.  She has taken Plan B pills in the past and does not remember the vaginal bleeding being as severe.    Review of Systems   Constitutional:  Negative for chills, fever and malaise/fatigue.   Cardiovascular:  Negative for chest pain and palpitations.   Gastrointestinal:  Positive for abdominal pain. Negative for nausea.   Genitourinary:  Negative for dysuria.   Neurological:  Negative for dizziness and headaches.       Medications, Allergies, and current problem list reviewed today in Epic.     Objective:     /80   Pulse 95   Temp 36.7 °C (98 °F) (Temporal)   Resp 16   Ht 1.651 m (5' 5\")   Wt 80.3 kg (177 lb)   SpO2 94%     Physical Exam  Vitals reviewed.   Constitutional:       General: She is not in acute distress.     Appearance: Normal appearance. She is not ill-appearing, toxic-appearing or diaphoretic.   HENT:      Head: Normocephalic.      Nose: Nose normal.      Mouth/Throat:      Lips: Pink.      Mouth: Mucous membranes are moist.   Eyes:      Extraocular Movements: Extraocular movements intact.      Conjunctiva/sclera: Conjunctivae normal.      Pupils: Pupils are equal, round, and reactive to light.   Cardiovascular:      Rate and Rhythm: Normal rate and regular rhythm.   Pulmonary:      Effort: Pulmonary effort is normal. "   Abdominal:      General: Abdomen is flat. Bowel sounds are normal. There is no distension.      Palpations: Abdomen is soft.      Tenderness: There is no abdominal tenderness. There is no guarding or rebound.   Musculoskeletal:         General: Normal range of motion.      Cervical back: Normal range of motion and neck supple.   Skin:     General: Skin is warm and dry.      Capillary Refill: Capillary refill takes less than 2 seconds.   Neurological:      Mental Status: She is alert and oriented to person, place, and time.   Psychiatric:         Mood and Affect: Mood normal.         Behavior: Behavior normal.         Thought Content: Thought content normal.         Judgment: Judgment normal.       Results for orders placed or performed in visit on 09/29/23   POCT Urinalysis   Result Value Ref Range    POC Color Dark Yellow Negative    POC Appearance Clear Negative    POC Glucose Negative Negative mg/dL    POC Bilirubin Negative Negative mg/dL    POC Ketones 40 Negative mg/dL    POC Specific Gravity >=1.030 <1.005 - >1.030    POC Blood Large Negative    POC Urine PH 5.5 5.0 - 8.0    POC Protein Negative Negative mg/dL    POC Urobiligen 0.2 Negative (0.2) mg/dL    POC Nitrites Negative Negative    POC Leukocyte Esterase Negative Negative   POCT PREGNANCY   Result Value Ref Range    POC Urine Pregnancy Test Negative     Internal Control Positive Positive     Internal Control Negative Negative          Assessment/Plan:     Diagnosis and associated orders:     1. Episode of heavy vaginal bleeding  POCT Urinalysis    POCT PREGNANCY         Comments/MDM:     This is acute condition.  Patient is nontoxic-appearing, alert and oriented.  Mucous membranes are pink and oral cavity and conjunctive a.  She is nontachycardic.  Blood pressure is within normal range.  Patient does not have any significant abdominal tenderness, rebound, or distention.  POCT hCG negative, urinalysis shows large amount of blood.  Discussed with  patient at length that this is normal side effects after taking Plan B pill bleeding can last 7 to 12 days.  Patient does not appear to be anemic in clinic.  Recommended that patient monitor for worsening bleeding including more than 5+ heavy maxi pads or tampons per day, dizziness, palpitations, shortness of breath, or nausea and follow-up in the emergency department if symptoms present.  Patient did verbalize understanding and agreed with above plan of care.         Differential diagnosis, natural history, supportive care, and indications for immediate follow-up discussed.    Advised the patient to follow-up with the primary care physician for recheck, reevaluation, and consideration of further management.    I personally reviewed prior external notes and test results pertinent to today's visit as well as additional imaging and testing completed in clinic today.     Please note that this dictation was created using voice recognition software. I have made a reasonable attempt to correct obvious errors, but I expect that there are errors of grammar and possibly content that I did not discover before finalizing the note.

## 2023-10-01 ASSESSMENT — ENCOUNTER SYMPTOMS
FEVER: 0
DIZZINESS: 0
ABDOMINAL PAIN: 1
NAUSEA: 0
CHILLS: 0
PALPITATIONS: 0
HEADACHES: 0

## 2023-10-03 ENCOUNTER — IMMUNIZATION (OUTPATIENT)
Dept: OCCUPATIONAL MEDICINE | Facility: CLINIC | Age: 33
End: 2023-10-03

## 2023-10-03 DIAGNOSIS — Z23 NEED FOR VACCINATION: Primary | ICD-10-CM

## 2023-10-03 PROCEDURE — 90686 IIV4 VACC NO PRSV 0.5 ML IM: CPT | Performed by: NURSE PRACTITIONER

## 2023-11-02 ENCOUNTER — HOSPITAL ENCOUNTER (OUTPATIENT)
Dept: LAB | Facility: MEDICAL CENTER | Age: 33
End: 2023-11-02
Attending: PSYCHIATRY & NEUROLOGY
Payer: COMMERCIAL

## 2023-11-02 LAB
25(OH)D3 SERPL-MCNC: 17 NG/ML (ref 30–100)
ALBUMIN SERPL BCP-MCNC: 4.7 G/DL (ref 3.2–4.9)
ALBUMIN/GLOB SERPL: 1.7 G/DL
ALP SERPL-CCNC: 223 U/L (ref 30–99)
ALT SERPL-CCNC: 59 U/L (ref 2–50)
ANION GAP SERPL CALC-SCNC: 11 MMOL/L (ref 7–16)
AST SERPL-CCNC: 39 U/L (ref 12–45)
BASOPHILS # BLD AUTO: 0.9 % (ref 0–1.8)
BASOPHILS # BLD: 0.04 K/UL (ref 0–0.12)
BILIRUB SERPL-MCNC: 0.4 MG/DL (ref 0.1–1.5)
BUN SERPL-MCNC: 9 MG/DL (ref 8–22)
CALCIUM ALBUM COR SERPL-MCNC: 8.7 MG/DL (ref 8.5–10.5)
CALCIUM SERPL-MCNC: 9.3 MG/DL (ref 8.5–10.5)
CHLORIDE SERPL-SCNC: 104 MMOL/L (ref 96–112)
CO2 SERPL-SCNC: 20 MMOL/L (ref 20–33)
CREAT SERPL-MCNC: 0.53 MG/DL (ref 0.5–1.4)
EOSINOPHIL # BLD AUTO: 0.12 K/UL (ref 0–0.51)
EOSINOPHIL NFR BLD: 2.6 % (ref 0–6.9)
ERYTHROCYTE [DISTWIDTH] IN BLOOD BY AUTOMATED COUNT: 42.7 FL (ref 35.9–50)
FOLATE SERPL-MCNC: 6.5 NG/ML
GFR SERPLBLD CREATININE-BSD FMLA CKD-EPI: 126 ML/MIN/1.73 M 2
GLOBULIN SER CALC-MCNC: 2.8 G/DL (ref 1.9–3.5)
GLUCOSE SERPL-MCNC: 106 MG/DL (ref 65–99)
HCT VFR BLD AUTO: 41.3 % (ref 37–47)
HGB BLD-MCNC: 13.2 G/DL (ref 12–16)
IMM GRANULOCYTES # BLD AUTO: 0.02 K/UL (ref 0–0.11)
IMM GRANULOCYTES NFR BLD AUTO: 0.4 % (ref 0–0.9)
IRON SATN MFR SERPL: 32 % (ref 15–55)
IRON SERPL-MCNC: 97 UG/DL (ref 40–170)
LYMPHOCYTES # BLD AUTO: 1.56 K/UL (ref 1–4.8)
LYMPHOCYTES NFR BLD: 33.3 % (ref 22–41)
MCH RBC QN AUTO: 27.2 PG (ref 27–33)
MCHC RBC AUTO-ENTMCNC: 32 G/DL (ref 32.2–35.5)
MCV RBC AUTO: 85.2 FL (ref 81.4–97.8)
MONOCYTES # BLD AUTO: 0.33 K/UL (ref 0–0.85)
MONOCYTES NFR BLD AUTO: 7 % (ref 0–13.4)
NEUTROPHILS # BLD AUTO: 2.62 K/UL (ref 1.82–7.42)
NEUTROPHILS NFR BLD: 55.8 % (ref 44–72)
NRBC # BLD AUTO: 0 K/UL
NRBC BLD-RTO: 0 /100 WBC (ref 0–0.2)
PLATELET # BLD AUTO: 172 K/UL (ref 164–446)
PMV BLD AUTO: 12.9 FL (ref 9–12.9)
POTASSIUM SERPL-SCNC: 4 MMOL/L (ref 3.6–5.5)
PROT SERPL-MCNC: 7.5 G/DL (ref 6–8.2)
RBC # BLD AUTO: 4.85 M/UL (ref 4.2–5.4)
SODIUM SERPL-SCNC: 135 MMOL/L (ref 135–145)
T3FREE SERPL-MCNC: 3.17 PG/ML (ref 2–4.4)
T4 FREE SERPL-MCNC: 1.11 NG/DL (ref 0.93–1.7)
THYROPEROXIDASE AB SERPL-ACNC: <9 IU/ML (ref 0–9)
TIBC SERPL-MCNC: 307 UG/DL (ref 250–450)
TSH SERPL DL<=0.005 MIU/L-ACNC: 0.99 UIU/ML (ref 0.38–5.33)
UIBC SERPL-MCNC: 210 UG/DL (ref 110–370)
VIT B12 SERPL-MCNC: 331 PG/ML (ref 211–911)
WBC # BLD AUTO: 4.7 K/UL (ref 4.8–10.8)

## 2023-11-02 PROCEDURE — 84481 FREE ASSAY (FT-3): CPT

## 2023-11-02 PROCEDURE — 80053 COMPREHEN METABOLIC PANEL: CPT

## 2023-11-02 PROCEDURE — 36415 COLL VENOUS BLD VENIPUNCTURE: CPT

## 2023-11-02 PROCEDURE — 81291 MTHFR GENE: CPT

## 2023-11-02 PROCEDURE — 86800 THYROGLOBULIN ANTIBODY: CPT

## 2023-11-02 PROCEDURE — 84443 ASSAY THYROID STIM HORMONE: CPT

## 2023-11-02 PROCEDURE — 83550 IRON BINDING TEST: CPT

## 2023-11-02 PROCEDURE — 82306 VITAMIN D 25 HYDROXY: CPT

## 2023-11-02 PROCEDURE — 86376 MICROSOMAL ANTIBODY EACH: CPT

## 2023-11-02 PROCEDURE — 84439 ASSAY OF FREE THYROXINE: CPT

## 2023-11-02 PROCEDURE — 83540 ASSAY OF IRON: CPT

## 2023-11-02 PROCEDURE — 85025 COMPLETE CBC W/AUTO DIFF WBC: CPT

## 2023-11-02 PROCEDURE — 82746 ASSAY OF FOLIC ACID SERUM: CPT

## 2023-11-02 PROCEDURE — 82607 VITAMIN B-12: CPT

## 2023-11-04 LAB — THYROGLOB AB SERPL-ACNC: <0.9 IU/ML (ref 0–4)

## 2023-11-07 LAB
MTHFR C.1298A>C GENO BLD/T: NEGATIVE
MTHFR C.677C>T GENO BLD/T: NORMAL
MTHFR GENE MUT ANL BLD/T: NORMAL

## 2024-01-09 ENCOUNTER — OFFICE VISIT (OUTPATIENT)
Dept: MEDICAL GROUP | Facility: MEDICAL CENTER | Age: 34
End: 2024-01-09
Payer: COMMERCIAL

## 2024-01-09 ENCOUNTER — APPOINTMENT (OUTPATIENT)
Dept: MEDICAL GROUP | Facility: MEDICAL CENTER | Age: 34
End: 2024-01-09
Payer: COMMERCIAL

## 2024-01-09 VITALS
HEIGHT: 65 IN | DIASTOLIC BLOOD PRESSURE: 70 MMHG | HEART RATE: 68 BPM | BODY MASS INDEX: 29.64 KG/M2 | OXYGEN SATURATION: 96 % | TEMPERATURE: 98.6 F | WEIGHT: 177.91 LBS | SYSTOLIC BLOOD PRESSURE: 110 MMHG

## 2024-01-09 DIAGNOSIS — K21.9 GASTROESOPHAGEAL REFLUX DISEASE, UNSPECIFIED WHETHER ESOPHAGITIS PRESENT: ICD-10-CM

## 2024-01-09 DIAGNOSIS — R10.84 GENERALIZED ABDOMINAL PAIN: ICD-10-CM

## 2024-01-09 DIAGNOSIS — Z23 NEED FOR VACCINATION: ICD-10-CM

## 2024-01-09 DIAGNOSIS — F42.2 MIXED OBSESSIONAL THOUGHTS AND ACTS: ICD-10-CM

## 2024-01-09 DIAGNOSIS — Z00.00 PE (PHYSICAL EXAM), ANNUAL: Primary | ICD-10-CM

## 2024-01-09 DIAGNOSIS — E55.9 VITAMIN D INSUFFICIENCY: ICD-10-CM

## 2024-01-09 DIAGNOSIS — F41.8 DEPRESSION WITH ANXIETY: ICD-10-CM

## 2024-01-09 DIAGNOSIS — R11.2 NAUSEA AND VOMITING, UNSPECIFIED VOMITING TYPE: ICD-10-CM

## 2024-01-09 DIAGNOSIS — R79.89 ELEVATED LFTS: ICD-10-CM

## 2024-01-09 DIAGNOSIS — Z11.3 SCREEN FOR STD (SEXUALLY TRANSMITTED DISEASE): ICD-10-CM

## 2024-01-09 PROBLEM — N92.0 MENORRHAGIA WITH REGULAR CYCLE: Status: RESOLVED | Noted: 2021-09-30 | Resolved: 2024-01-09

## 2024-01-09 PROCEDURE — 99395 PREV VISIT EST AGE 18-39: CPT | Mod: 25 | Performed by: FAMILY MEDICINE

## 2024-01-09 PROCEDURE — 90651 9VHPV VACCINE 2/3 DOSE IM: CPT | Performed by: FAMILY MEDICINE

## 2024-01-09 PROCEDURE — 90471 IMMUNIZATION ADMIN: CPT | Performed by: FAMILY MEDICINE

## 2024-01-09 PROCEDURE — 99214 OFFICE O/P EST MOD 30 MIN: CPT | Mod: 25 | Performed by: FAMILY MEDICINE

## 2024-01-09 PROCEDURE — 3078F DIAST BP <80 MM HG: CPT | Performed by: FAMILY MEDICINE

## 2024-01-09 PROCEDURE — 3074F SYST BP LT 130 MM HG: CPT | Performed by: FAMILY MEDICINE

## 2024-01-09 RX ORDER — BUSPIRONE HYDROCHLORIDE 10 MG/1
10 TABLET ORAL 2 TIMES DAILY
COMMUNITY

## 2024-01-09 RX ORDER — PANTOPRAZOLE SODIUM 20 MG/1
20 TABLET, DELAYED RELEASE ORAL DAILY
Qty: 90 TABLET | Refills: 0 | Status: SHIPPED | OUTPATIENT
Start: 2024-01-09 | End: 2024-03-21

## 2024-01-09 RX ORDER — UBIDECARENONE 75 MG
100 CAPSULE ORAL DAILY
COMMUNITY

## 2024-01-09 RX ORDER — ONDANSETRON 4 MG/1
4 TABLET, ORALLY DISINTEGRATING ORAL EVERY 8 HOURS PRN
Qty: 30 TABLET | Refills: 0 | Status: SHIPPED | OUTPATIENT
Start: 2024-01-09 | End: 2024-02-08

## 2024-01-09 ASSESSMENT — FIBROSIS 4 INDEX: FIB4 SCORE: 0.94

## 2024-01-09 ASSESSMENT — PATIENT HEALTH QUESTIONNAIRE - PHQ9
CLINICAL INTERPRETATION OF PHQ2 SCORE: 2
5. POOR APPETITE OR OVEREATING: 0 - NOT AT ALL
SUM OF ALL RESPONSES TO PHQ QUESTIONS 1-9: 6

## 2024-01-09 NOTE — PROGRESS NOTES
Subjective:   CC: aPE, abdominal pain, nausea/vomiting     HPI:     Williams Singletary is a 33 y.o. female, established patient of the clinic.     Patient has chronic depression with anxiety, seasonal allergy, OCD.  Patient is working with behavioral health and psychiatry.  Her symptoms are controlled with BuSpar 10 mg twice daily.  Patient was previously diagnosed with GERD.  She complains of worsening acid reflux over the past few months.  She also complains of generalized abdominal pain in the morning associated with nausea and intermittent vomiting.  Symptoms onset approximately 3 months ago.  No recent changes in diet or medication.  GI symptoms does not subside until midday.  She tries over-the-counter omeprazole and famotidine without relief of acid reflux.  Patient was found to have mild elevated ALT and alkaline phosphatase per labs done in November 2023 ordered by her psychiatrist.  Denies fever, chills, melena, hematochezia.  She generally has bowel movement twice per week.  This has been her pattern for as long as she could remember.  Her father had some sort of GI problems, however, patient does not have more information regarding this.    She intermittently experiences abdominal pain at the right upper quadrant radiating to the mid back.    Patient lives with her boyfriend.  She is sexually active.  She currently does not use any contraception.  She tested negative for pregnancy several times at home.  She does not take MJ.  Denies regular consumption of NSAID or alcohol.  Patient does drink a lot of coffee, tea, and energy drinks.  She also enjoys eating spicy foods.  Spicy foods appear to trigger worsening acid reflux.  Patient is status post appendectomy.    Current medicines (including changes today)  Current Outpatient Medications   Medication Sig Dispense Refill    busPIRone (BUSPAR) 10 MG Tab tablet Take 10 mg by mouth 2 times a day.      cyanocobalamin (VITAMIN B-12) 100 MCG Tab Take 100 mcg by  "mouth every day.      pantoprazole (PROTONIX) 20 MG tablet Take 1 Tablet by mouth every day. 90 Tablet 0    ondansetron (ZOFRAN ODT) 4 MG TABLET DISPERSIBLE Take 1 Tablet by mouth every 8 hours as needed for Nausea/Vomiting for up to 30 days. 30 Tablet 0    ibuprofen (MOTRIN) 800 MG Tab Take 1 Tablet by mouth every 8 hours as needed for Moderate Pain. 30 Tablet 0    fexofenadine (ALLEGRA) 60 MG Tab Take 60 mg by mouth every day.       No current facility-administered medications for this visit.     She  has a past medical history of Allergy, Anemia, and H/O bladder infections.    I reviewed patient's problem list, allergies, medications, family hx, social hx with patient and update EPIC.        Objective:     /70 (BP Location: Right arm, Patient Position: Sitting, BP Cuff Size: Adult)   Pulse 68   Temp 37 °C (98.6 °F) (Temporal)   Ht 1.651 m (5' 5\")   Wt 80.7 kg (177 lb 14.6 oz)   SpO2 96%  Body mass index is 29.61 kg/m².    Physical Exam:  Constitutional: awake, alert, in no distress.  Skin: Warm, dry, good turgor, no rashes, bruises, ulcers in visible areas.  Eye: conjunctiva clear, lids neg for edema or lesions.  Neck: Trachea midline, no masses, no thyromegaly. No cervical or supraclavicular lymphadenopathy  Respiratory: Unlabored respiratory effort, lungs clear to auscultation, no wheezes, no rales.  Cardiovascular: Normal S1, S2, no murmur, no pedal edema.  Abdomen: Soft, non-tender to palpation, hypoactive BS, no hernia, no hepatosplenomegaly, negative rebound or guarding.   Neuro: CN2-12 grossly intact. Strength 5/5, reflexes 2/4 in all extremities, no sensory deficits.   Psych: Oriented x3, affect and mood wnl, intact judgement and insight.       Assessment and Plan:   The following treatment plan was discussed    1. Vitamin D insufficiency  Recent labs were notable for vitamin D insufficiency.  -Recommended 5000 units of vitamin D daily.    2. Mixed obsessional thoughts and acts  4. Depression " with anxiety  Chronic, controlled with buspirone 10 mg twice daily.  Patient is working with behavioral health and psychiatrist.    3. Screen for STD (sexually transmitted disease)  - Chlamydia/GC, PCR (Urine); Future  - T.PALLIDUM AB LAUREL (SCREENING); Future  - HIV AG/AB COMBO ASSAY SCREENING; Future      5. Need for vaccination  - Gardasil 9    6. PE (physical exam), annual  Labs per orders  Immunization reviewed and discussed.  Patient was counseled about  diet, supplements, exercises.   Preventive cares reviewed, discussed, and updated as appropriate.     - Comp Metabolic Panel; Future  - HEMOGLOBIN A1C; Future  - Lipid Profile; Future    7. Nausea and vomiting, unspecified vomiting type  8. Gastroesophageal reflux disease, unspecified whether esophagitis present  9. Generalized abdominal pain  10. Elevated LFTs  History and exam are concerning for poorly controlled GERD with new onset nausea/vomiting and generalized abdominal pain in the morning, onset approximately 3 months ago.  Patient tried and failed over-the-counter famotidine and omeprazole.  She might have slow transit constipation.  Negative history of regular NSAID, alcoholism, marijuana or illicit drug abuse.  Patient is sexually active.  She is not having reliable contraception, will test for pregnancy.  Recent labs show elevated LFT. Body mass index is 29.61 kg/m².  She is status post appendectomy a few years ago.  - US-RUQ; Future  - HELICOBACTER PYLORI BREATH TEST  - CELIAC DISEASE AB PANEL; Future  - pantoprazole (PROTONIX) 20 MG tablet; Take 1 Tablet by mouth every day.  Dispense: 90 Tablet; Refill: 0  - ondansetron (ZOFRAN ODT) 4 MG TABLET DISPERSIBLE; Take 1 Tablet by mouth every 8 hours as needed for Nausea/Vomiting for up to 30 days.  Dispense: 30 Tablet; Refill: 0  - [id number] VIEWS; Future  - Hydration, increase fibers in her diet, regular exercises.  - ER precautions discussed.   - follow up in 3 months       Kathy Ash,  M.D.      Followup: Return in about 3 months (around 4/9/2024) for abdominal pain.    Please note that this dictation was created using voice recognition software. I have made every reasonable attempt to correct obvious errors, but I expect that there are errors of grammar and possibly content that I did not discover before finalizing the note.

## 2024-01-16 ENCOUNTER — HOSPITAL ENCOUNTER (OUTPATIENT)
Dept: LAB | Facility: MEDICAL CENTER | Age: 34
End: 2024-01-16
Attending: FAMILY MEDICINE
Payer: COMMERCIAL

## 2024-01-16 DIAGNOSIS — R10.84 GENERALIZED ABDOMINAL PAIN: ICD-10-CM

## 2024-01-16 DIAGNOSIS — Z11.3 SCREEN FOR STD (SEXUALLY TRANSMITTED DISEASE): ICD-10-CM

## 2024-01-16 DIAGNOSIS — Z00.00 PE (PHYSICAL EXAM), ANNUAL: ICD-10-CM

## 2024-01-16 DIAGNOSIS — R11.2 NAUSEA AND VOMITING, UNSPECIFIED VOMITING TYPE: ICD-10-CM

## 2024-01-16 LAB
ALBUMIN SERPL BCP-MCNC: 4.4 G/DL (ref 3.2–4.9)
ALBUMIN/GLOB SERPL: 1.7 G/DL
ALP SERPL-CCNC: 128 U/L (ref 30–99)
ALT SERPL-CCNC: 26 U/L (ref 2–50)
ANION GAP SERPL CALC-SCNC: 9 MMOL/L (ref 7–16)
AST SERPL-CCNC: 27 U/L (ref 12–45)
BILIRUB SERPL-MCNC: 0.4 MG/DL (ref 0.1–1.5)
BUN SERPL-MCNC: 10 MG/DL (ref 8–22)
CALCIUM ALBUM COR SERPL-MCNC: 8.8 MG/DL (ref 8.5–10.5)
CALCIUM SERPL-MCNC: 9.1 MG/DL (ref 8.5–10.5)
CHLORIDE SERPL-SCNC: 105 MMOL/L (ref 96–112)
CHOLEST SERPL-MCNC: 173 MG/DL (ref 100–199)
CO2 SERPL-SCNC: 23 MMOL/L (ref 20–33)
CREAT SERPL-MCNC: 0.51 MG/DL (ref 0.5–1.4)
EST. AVERAGE GLUCOSE BLD GHB EST-MCNC: 97 MG/DL
GFR SERPLBLD CREATININE-BSD FMLA CKD-EPI: 127 ML/MIN/1.73 M 2
GLOBULIN SER CALC-MCNC: 2.6 G/DL (ref 1.9–3.5)
GLUCOSE SERPL-MCNC: 93 MG/DL (ref 65–99)
HBA1C MFR BLD: 5 % (ref 4–5.6)
HDLC SERPL-MCNC: 58 MG/DL
LDLC SERPL CALC-MCNC: 101 MG/DL
POTASSIUM SERPL-SCNC: 4.2 MMOL/L (ref 3.6–5.5)
PROT SERPL-MCNC: 7 G/DL (ref 6–8.2)
SODIUM SERPL-SCNC: 137 MMOL/L (ref 135–145)
TRIGL SERPL-MCNC: 69 MG/DL (ref 0–149)

## 2024-01-16 PROCEDURE — 86780 TREPONEMA PALLIDUM: CPT

## 2024-01-16 PROCEDURE — 87591 N.GONORRHOEAE DNA AMP PROB: CPT

## 2024-01-16 PROCEDURE — 84703 CHORIONIC GONADOTROPIN ASSAY: CPT

## 2024-01-16 PROCEDURE — 86258 DGP ANTIBODY EACH IG CLASS: CPT | Mod: 91

## 2024-01-16 PROCEDURE — 86364 TISS TRNSGLTMNASE EA IG CLAS: CPT | Mod: 91

## 2024-01-16 PROCEDURE — 83013 H PYLORI (C-13) BREATH: CPT

## 2024-01-16 PROCEDURE — 80061 LIPID PANEL: CPT

## 2024-01-16 PROCEDURE — 36415 COLL VENOUS BLD VENIPUNCTURE: CPT

## 2024-01-16 PROCEDURE — 83036 HEMOGLOBIN GLYCOSYLATED A1C: CPT

## 2024-01-16 PROCEDURE — 87491 CHLMYD TRACH DNA AMP PROBE: CPT

## 2024-01-16 PROCEDURE — 80053 COMPREHEN METABOLIC PANEL: CPT

## 2024-01-16 PROCEDURE — 87389 HIV-1 AG W/HIV-1&-2 AB AG IA: CPT

## 2024-01-17 LAB
C TRACH DNA SPEC QL NAA+PROBE: NEGATIVE
HCG SERPL QL: NEGATIVE
HIV 1+2 AB+HIV1 P24 AG SERPL QL IA: NORMAL
N GONORRHOEA DNA SPEC QL NAA+PROBE: NEGATIVE
SPECIMEN SOURCE: NORMAL
T PALLIDUM AB SER QL IA: NORMAL

## 2024-01-18 LAB
GLIADIN IGA SER IA-ACNC: <0.72 FLU (ref 0–4.99)
GLIADIN IGG SER IA-ACNC: 1.54 FLU (ref 0–4.99)
TTG IGA SER IA-ACNC: <1.02 FLU (ref 0–4.99)
TTG IGG SER IA-ACNC: <0.82 FLU (ref 0–4.99)
UREA BREATH TEST QL: NEGATIVE

## 2024-01-24 ENCOUNTER — APPOINTMENT (OUTPATIENT)
Dept: RADIOLOGY | Facility: MEDICAL CENTER | Age: 34
End: 2024-01-24
Attending: FAMILY MEDICINE
Payer: COMMERCIAL

## 2024-01-24 DIAGNOSIS — R10.84 GENERALIZED ABDOMINAL PAIN: ICD-10-CM

## 2024-01-24 DIAGNOSIS — R11.2 NAUSEA AND VOMITING, UNSPECIFIED VOMITING TYPE: ICD-10-CM

## 2024-01-24 PROCEDURE — 74019 RADEX ABDOMEN 2 VIEWS: CPT

## 2024-01-31 ENCOUNTER — HOSPITAL ENCOUNTER (OUTPATIENT)
Dept: RADIOLOGY | Facility: MEDICAL CENTER | Age: 34
End: 2024-01-31
Attending: FAMILY MEDICINE
Payer: COMMERCIAL

## 2024-01-31 DIAGNOSIS — R10.84 GENERALIZED ABDOMINAL PAIN: ICD-10-CM

## 2024-01-31 PROCEDURE — 76705 ECHO EXAM OF ABDOMEN: CPT

## 2024-03-21 ENCOUNTER — OFFICE VISIT (OUTPATIENT)
Dept: URGENT CARE | Facility: PHYSICIAN GROUP | Age: 34
End: 2024-03-21
Payer: COMMERCIAL

## 2024-03-21 VITALS
HEART RATE: 62 BPM | OXYGEN SATURATION: 96 % | BODY MASS INDEX: 29.49 KG/M2 | RESPIRATION RATE: 16 BRPM | TEMPERATURE: 98.1 F | HEIGHT: 65 IN | DIASTOLIC BLOOD PRESSURE: 80 MMHG | SYSTOLIC BLOOD PRESSURE: 118 MMHG | WEIGHT: 177 LBS

## 2024-03-21 DIAGNOSIS — N30.01 ACUTE CYSTITIS WITH HEMATURIA: ICD-10-CM

## 2024-03-21 DIAGNOSIS — R30.0 DYSURIA: ICD-10-CM

## 2024-03-21 LAB
APPEARANCE UR: NORMAL
BILIRUB UR STRIP-MCNC: NORMAL MG/DL
COLOR UR AUTO: NORMAL
GLUCOSE UR STRIP.AUTO-MCNC: 250 MG/DL
KETONES UR STRIP.AUTO-MCNC: NORMAL MG/DL
LEUKOCYTE ESTERASE UR QL STRIP.AUTO: NORMAL
NITRITE UR QL STRIP.AUTO: POSITIVE
PH UR STRIP.AUTO: 5 [PH] (ref 5–8)
POCT INT CON NEG: NEGATIVE
POCT INT CON POS: NEGATIVE
POCT URINE PREGNANCY TEST: NEGATIVE
PROT UR QL STRIP: >=300 MG/DL
RBC UR QL AUTO: NORMAL
SP GR UR STRIP.AUTO: <=1.005
UROBILINOGEN UR STRIP-MCNC: 4 MG/DL

## 2024-03-21 PROCEDURE — 99213 OFFICE O/P EST LOW 20 MIN: CPT | Performed by: NURSE PRACTITIONER

## 2024-03-21 PROCEDURE — 81025 URINE PREGNANCY TEST: CPT | Performed by: NURSE PRACTITIONER

## 2024-03-21 PROCEDURE — 81002 URINALYSIS NONAUTO W/O SCOPE: CPT | Performed by: NURSE PRACTITIONER

## 2024-03-21 PROCEDURE — 3079F DIAST BP 80-89 MM HG: CPT | Performed by: NURSE PRACTITIONER

## 2024-03-21 PROCEDURE — 3074F SYST BP LT 130 MM HG: CPT | Performed by: NURSE PRACTITIONER

## 2024-03-21 RX ORDER — CEFDINIR 300 MG/1
300 CAPSULE ORAL 2 TIMES DAILY
Qty: 14 CAPSULE | Refills: 0 | Status: SHIPPED | OUTPATIENT
Start: 2024-03-21 | End: 2024-03-28

## 2024-03-21 ASSESSMENT — FIBROSIS 4 INDEX: FIB4 SCORE: 0.99

## 2024-03-22 NOTE — PROGRESS NOTES
"Subjective:   Williams Singletary is a 33 y.o. female who presents for UTI (X 2 days low back pain, blood in urine last night, frequency in urination with burning. Did take azo to help. )    Patient is a 31-year-old female presents clinic today with 2-day history of lower back pain, blood in her urine started last night, frequency, urgency, and pain with urination.  She does report mild mid back pain.  She has not had any fevers, chills, nausea, or vomiting.  Denies any history of pyelonephritis or renal calculi.  Patient has been taking Azo which has helped with her symptoms.      Medications, Allergies, and current problem list reviewed today in Epic.     Objective:     /80   Pulse 62   Temp 36.7 °C (98.1 °F) (Temporal)   Resp 16   Ht 1.651 m (5' 5\")   Wt 80.3 kg (177 lb)   SpO2 96%     Physical Exam  Vitals reviewed.   Constitutional:       General: She is not in acute distress.     Appearance: Normal appearance. She is not ill-appearing or toxic-appearing.   HENT:      Head: Normocephalic.      Nose: Nose normal.      Mouth/Throat:      Mouth: Mucous membranes are moist.   Eyes:      Extraocular Movements: Extraocular movements intact.      Conjunctiva/sclera: Conjunctivae normal.      Pupils: Pupils are equal, round, and reactive to light.   Cardiovascular:      Rate and Rhythm: Normal rate.   Pulmonary:      Effort: Pulmonary effort is normal.   Abdominal:      General: Abdomen is flat. Bowel sounds are normal. There is no distension.      Palpations: Abdomen is soft. There is no mass.      Tenderness: There is no abdominal tenderness. There is left CVA tenderness. There is no right CVA tenderness or guarding.   Musculoskeletal:         General: Normal range of motion.      Cervical back: Normal range of motion and neck supple.   Skin:     General: Skin is warm and dry.   Neurological:      Mental Status: She is alert and oriented to person, place, and time.   Psychiatric:         Mood and Affect: Mood " normal.         Behavior: Behavior normal.         Thought Content: Thought content normal.         Judgment: Judgment normal.       Results for orders placed or performed in visit on 03/21/24   POCT Urinalysis   Result Value Ref Range    POC Color red Negative    POC Appearance cloudy Negative    POC Glucose 250 Negative mg/dL    POC Bilirubin small Negative mg/dL    POC Ketones trace Negative mg/dL    POC Specific Gravity <=1.005 <1.005 - >1.030    POC Blood moderate Negative    POC Urine PH 5.0 5.0 - 8.0    POC Protein >=300 Negative mg/dL    POC Urobiligen 4.0 Negative (0.2) mg/dL    POC Nitrites positive Negative    POC Leukocyte Esterase large Negative   POCT PREGNANCY   Result Value Ref Range    POC Urine Pregnancy Test Negative     Internal Control Positive Negative     Internal Control Negative Negative          Assessment/Plan:     Diagnosis and associated orders:     1. Dysuria  POCT Urinalysis    POCT PREGNANCY      2. Acute cystitis with hematuria  cefdinir (OMNICEF) 300 MG Cap         Comments/MDM:     This acute condition.  POCT urinalysis is consistent with UTI along with HPI and physical exam findings.  Will go ahead and treat with cefdinir as she is tolerated this well in the past.  On physical exam patient does not have any suprapubic tenderness, mild tenderness over left CVA and lower back.  Discussed with patient signs symptoms of pyelonephritis versus renal calculi versus UTI.  If symptoms acutely worsen or new symptoms arise recommended patient follow-up in the emergency department for further evaluation.  Stressed to patient the importance of pushing fluids, may take over-the-counter Azo, Tylenol, and or Motrin as needed for pain.  Patient was involved with shared decision-making throughout the exam today and verbalizes understanding regards to plan of care, discharge instructions, and follow-up         Differential diagnosis, natural history, supportive care, and indications for immediate  follow-up discussed.    Advised the patient to follow-up with the primary care physician for recheck, reevaluation, and consideration of further management.    I personally reviewed prior external notes and test results pertinent to today's visit as well as additional imaging and testing completed in clinic today.     Please note that this dictation was created using voice recognition software. I have made a reasonable attempt to correct obvious errors, but I expect that there are errors of grammar and possibly content that I did not discover before finalizing the note.

## 2024-04-11 ENCOUNTER — TELEMEDICINE (OUTPATIENT)
Dept: MEDICAL GROUP | Facility: MEDICAL CENTER | Age: 34
End: 2024-04-11
Payer: COMMERCIAL

## 2024-04-11 VITALS
DIASTOLIC BLOOD PRESSURE: 86 MMHG | WEIGHT: 176 LBS | HEIGHT: 65 IN | BODY MASS INDEX: 29.32 KG/M2 | SYSTOLIC BLOOD PRESSURE: 134 MMHG

## 2024-04-11 DIAGNOSIS — E78.1 PURE HYPERTRIGLYCERIDEMIA: ICD-10-CM

## 2024-04-11 DIAGNOSIS — F41.8 DEPRESSION WITH ANXIETY: ICD-10-CM

## 2024-04-11 DIAGNOSIS — F42.2 MIXED OBSESSIONAL THOUGHTS AND ACTS: ICD-10-CM

## 2024-04-11 DIAGNOSIS — K21.9 GASTROESOPHAGEAL REFLUX DISEASE, UNSPECIFIED WHETHER ESOPHAGITIS PRESENT: ICD-10-CM

## 2024-04-11 PROCEDURE — 99214 OFFICE O/P EST MOD 30 MIN: CPT | Mod: 95 | Performed by: FAMILY MEDICINE

## 2024-04-11 ASSESSMENT — FIBROSIS 4 INDEX: FIB4 SCORE: 0.99

## 2024-04-11 NOTE — PROGRESS NOTES
"Virtual Visit: Established Patient   This visit was conducted via Zoom using secure and encrypted videoconferencing technology.   The patient was in their home in the [facility].    The patient's identity was confirmed and verbal consent was obtained for this virtual visit.       Subjective:   CC: mental health follow up, GI symptoms follow up     HPI:     Williams Singletary is a 33 y.o. female, established patient of the clinic.     Patient has not tried PPI. GERD is controlled with dietary modification.  Recent tests including CBC, CMP, H. pylori, celiac panel were negative.  Patient takes over-the-counter antacids as needed.  She recently added methylfolate to control depression/anxiety.  She continues to take BuSpar 10 mg twice daily.  She is active and tries to exercise regularly.  Recent labs showed mild hyperlipidemia.  Negative history of illicit drugs, alcohol, tobacco abuse.    Current medicines (including changes today)  Current Outpatient Medications   Medication Sig Dispense Refill    Levomefolate Glucosamine (METHYLFOLATE PO) Take 15 mg by mouth.      busPIRone (BUSPAR) 10 MG Tab tablet Take 10 mg by mouth 2 times a day.      cyanocobalamin (VITAMIN B-12) 100 MCG Tab Take 100 mcg by mouth every day.      fexofenadine (ALLEGRA) 60 MG Tab Take 60 mg by mouth every day.       No current facility-administered medications for this visit.       Patient Active Problem List    Diagnosis Date Noted    Pure hypertriglyceridemia 04/11/2024    History of iron deficiency anemia 06/09/2022    OCD (obsessive compulsive disorder) 06/09/2022    Depression with anxiety 06/09/2022    Gastroesophageal reflux disease 09/30/2021    Seasonal allergies 07/21/2016        Objective:   /86 Comment: patient reported  Ht 1.651 m (5' 5\") Comment: patient reported  Wt 79.8 kg (176 lb) Comment: patient reported  BMI 29.29 kg/m²     Physical Exam:  Constitutional: Alert, no distress, well-groomed.  Skin: No rashes in " visible areas.  Eye: Round. Conjunctiva clear, lids normal. No icterus.   ENMT: Lips pink without lesions, good dentition, moist mucous membranes. Phonation normal.  Neck: No masses, no thyromegaly. Moves freely without pain.  Respiratory: Unlabored respiratory effort, no cough or audible wheeze  Psych: Alert and oriented x3, normal affect and mood.     Assessment and Plan:   The following treatment plan was discussed:     1. Gastroesophageal reflux disease, unspecified whether esophagitis present  Chronic, controlled with diet and over-the-counter medications.   She did not start PPI prescribed during office visit.   She is doing relatively well at this time.   All her tests including H.Pylori test, celiac disease panel, CBC, CMP was negative.  Patient was advised to continue to monitor symptoms and follow-up with me for any changes.    Continue over-the-counter medication as needed.    2. Pure hypertriglyceridemia  Recent labs was notable for mild hyperlipidemia.  Pharmacotherapy not indicated.  - dietary modification, regular exercises, and maintain healthy body weight  - avoid alcohol, drugs, tobacco products     3. Depression with anxiety  4. Mixed obsessional thoughts and acts  Chronic, controlled with BuSpar 10 mg twice daily and methylfolate 15 mg daily. Will continue to monitor.       Follow-up: Return for As needed.

## 2024-07-24 ENCOUNTER — HOSPITAL ENCOUNTER (OUTPATIENT)
Facility: MEDICAL CENTER | Age: 34
End: 2024-07-24
Attending: OBSTETRICS & GYNECOLOGY
Payer: COMMERCIAL

## 2024-07-24 PROCEDURE — 87491 CHLMYD TRACH DNA AMP PROBE: CPT

## 2024-07-24 PROCEDURE — 87624 HPV HI-RISK TYP POOLED RSLT: CPT

## 2024-07-24 PROCEDURE — 87661 TRICHOMONAS VAGINALIS AMPLIF: CPT

## 2024-07-24 PROCEDURE — 88175 CYTOPATH C/V AUTO FLUID REDO: CPT

## 2024-07-24 PROCEDURE — 87591 N.GONORRHOEAE DNA AMP PROB: CPT

## 2024-07-26 LAB
SPEC CONTAINER SPEC: NORMAL
SPECIMEN SOURCE: NORMAL
T VAGINALIS RRNA SPEC QL NAA+PROBE: NEGATIVE

## 2024-07-27 LAB
C TRACH RRNA CVX QL NAA+PROBE: NEGATIVE
CYTOLOGIST CVX/VAG CYTO: ABNORMAL
CYTOLOGY CVX/VAG DOC CYTO: ABNORMAL
CYTOLOGY CVX/VAG DOC THIN PREP: ABNORMAL
HPV I/H RISK 4 DNA CVX QL PROBE+SIG AMP: NEGATIVE
N GONORRHOEA RRNA CVX QL NAA+PROBE: POSITIVE
NOTE NL11727A: ABNORMAL
OTHER STN SPEC: ABNORMAL
STAT OF ADQ CVX/VAG CYTO-IMP: ABNORMAL

## 2024-07-29 ENCOUNTER — HOSPITAL ENCOUNTER (OUTPATIENT)
Facility: MEDICAL CENTER | Age: 34
End: 2024-07-29
Attending: OBSTETRICS & GYNECOLOGY
Payer: COMMERCIAL

## 2024-07-29 PROCEDURE — 87591 N.GONORRHOEAE DNA AMP PROB: CPT

## 2024-07-29 PROCEDURE — 87491 CHLMYD TRACH DNA AMP PROBE: CPT

## 2024-07-30 ENCOUNTER — HOSPITAL ENCOUNTER (OUTPATIENT)
Dept: LAB | Facility: MEDICAL CENTER | Age: 34
End: 2024-07-30
Attending: OBSTETRICS & GYNECOLOGY
Payer: COMMERCIAL

## 2024-07-30 LAB
ABO GROUP BLD: NORMAL
APPEARANCE UR: ABNORMAL
BACTERIA #/AREA URNS HPF: ABNORMAL /HPF
BASOPHILS # BLD AUTO: 0.8 % (ref 0–1.8)
BASOPHILS # BLD: 0.04 K/UL (ref 0–0.12)
BILIRUB UR QL STRIP.AUTO: NEGATIVE
BLD GP AB SCN SERPL QL: NORMAL
COLOR UR: YELLOW
EOSINOPHIL # BLD AUTO: 0.1 K/UL (ref 0–0.51)
EOSINOPHIL NFR BLD: 2.1 % (ref 0–6.9)
EPI CELLS #/AREA URNS HPF: ABNORMAL /HPF
ERYTHROCYTE [DISTWIDTH] IN BLOOD BY AUTOMATED COUNT: 45.1 FL (ref 35.9–50)
GLUCOSE UR STRIP.AUTO-MCNC: NEGATIVE MG/DL
HBV SURFACE AG SER QL: ABNORMAL
HCT VFR BLD AUTO: 37.1 % (ref 37–47)
HGB BLD-MCNC: 12.1 G/DL (ref 12–16)
HIV 1+2 AB+HIV1 P24 AG SERPL QL IA: NORMAL
HYALINE CASTS #/AREA URNS LPF: ABNORMAL /LPF
IMM GRANULOCYTES # BLD AUTO: 0.02 K/UL (ref 0–0.11)
IMM GRANULOCYTES NFR BLD AUTO: 0.4 % (ref 0–0.9)
KETONES UR STRIP.AUTO-MCNC: NEGATIVE MG/DL
LEUKOCYTE ESTERASE UR QL STRIP.AUTO: NEGATIVE
LYMPHOCYTES # BLD AUTO: 1.26 K/UL (ref 1–4.8)
LYMPHOCYTES NFR BLD: 26.4 % (ref 22–41)
MCH RBC QN AUTO: 26 PG (ref 27–33)
MCHC RBC AUTO-ENTMCNC: 32.6 G/DL (ref 32.2–35.5)
MCV RBC AUTO: 79.8 FL (ref 81.4–97.8)
MICRO URNS: ABNORMAL
MONOCYTES # BLD AUTO: 0.41 K/UL (ref 0–0.85)
MONOCYTES NFR BLD AUTO: 8.6 % (ref 0–13.4)
MUCOUS THREADS #/AREA URNS HPF: ABNORMAL /HPF
NEUTROPHILS # BLD AUTO: 2.94 K/UL (ref 1.82–7.42)
NEUTROPHILS NFR BLD: 61.7 % (ref 44–72)
NITRITE UR QL STRIP.AUTO: NEGATIVE
NRBC # BLD AUTO: 0 K/UL
NRBC BLD-RTO: 0 /100 WBC (ref 0–0.2)
PH UR STRIP.AUTO: 5.5 [PH] (ref 5–8)
PLATELET # BLD AUTO: 144 K/UL (ref 164–446)
PMV BLD AUTO: 13.2 FL (ref 9–12.9)
PROT UR QL STRIP: NEGATIVE MG/DL
RBC # BLD AUTO: 4.65 M/UL (ref 4.2–5.4)
RBC # URNS HPF: ABNORMAL /HPF
RBC UR QL AUTO: ABNORMAL
RH BLD: NORMAL
RUBV AB SER QL: 4.99 IU/ML
SP GR UR STRIP.AUTO: >=1.03
T PALLIDUM AB SER QL IA: ABNORMAL
WBC # BLD AUTO: 4.8 K/UL (ref 4.8–10.8)
WBC #/AREA URNS HPF: ABNORMAL /HPF

## 2024-07-30 PROCEDURE — 87340 HEPATITIS B SURFACE AG IA: CPT

## 2024-07-30 PROCEDURE — 87389 HIV-1 AG W/HIV-1&-2 AB AG IA: CPT

## 2024-07-30 PROCEDURE — 85025 COMPLETE CBC W/AUTO DIFF WBC: CPT

## 2024-07-30 PROCEDURE — 81001 URINALYSIS AUTO W/SCOPE: CPT

## 2024-07-30 PROCEDURE — 86850 RBC ANTIBODY SCREEN: CPT

## 2024-07-30 PROCEDURE — 86592 SYPHILIS TEST NON-TREP QUAL: CPT

## 2024-07-30 PROCEDURE — 86900 BLOOD TYPING SEROLOGIC ABO: CPT

## 2024-07-30 PROCEDURE — 86901 BLOOD TYPING SEROLOGIC RH(D): CPT

## 2024-07-30 PROCEDURE — 86780 TREPONEMA PALLIDUM: CPT

## 2024-07-30 PROCEDURE — 86762 RUBELLA ANTIBODY: CPT

## 2024-07-30 PROCEDURE — 36415 COLL VENOUS BLD VENIPUNCTURE: CPT

## 2024-07-31 LAB
C TRACH DNA GENITAL QL NAA+PROBE: NEGATIVE
N GONORRHOEA DNA GENITAL QL NAA+PROBE: NEGATIVE
SPECIMEN SOURCE: NORMAL

## 2024-09-10 ENCOUNTER — HOSPITAL ENCOUNTER (OUTPATIENT)
Dept: LAB | Facility: MEDICAL CENTER | Age: 34
End: 2024-09-10
Attending: OBSTETRICS & GYNECOLOGY
Payer: COMMERCIAL

## 2024-09-10 LAB
BASOPHILS # BLD AUTO: 0.4 % (ref 0–1.8)
BASOPHILS # BLD: 0.03 K/UL (ref 0–0.12)
EOSINOPHIL # BLD AUTO: 0.07 K/UL (ref 0–0.51)
EOSINOPHIL NFR BLD: 0.9 % (ref 0–6.9)
ERYTHROCYTE [DISTWIDTH] IN BLOOD BY AUTOMATED COUNT: 46.9 FL (ref 35.9–50)
HCT VFR BLD AUTO: 36.3 % (ref 37–47)
HGB BLD-MCNC: 11.9 G/DL (ref 12–16)
IMM GRANULOCYTES # BLD AUTO: 0.02 K/UL (ref 0–0.11)
IMM GRANULOCYTES NFR BLD AUTO: 0.2 % (ref 0–0.9)
LYMPHOCYTES # BLD AUTO: 1.46 K/UL (ref 1–4.8)
LYMPHOCYTES NFR BLD: 18.2 % (ref 22–41)
MCH RBC QN AUTO: 26.5 PG (ref 27–33)
MCHC RBC AUTO-ENTMCNC: 32.8 G/DL (ref 32.2–35.5)
MCV RBC AUTO: 80.8 FL (ref 81.4–97.8)
MONOCYTES # BLD AUTO: 0.39 K/UL (ref 0–0.85)
MONOCYTES NFR BLD AUTO: 4.9 % (ref 0–13.4)
NEUTROPHILS # BLD AUTO: 6.07 K/UL (ref 1.82–7.42)
NEUTROPHILS NFR BLD: 75.4 % (ref 44–72)
NRBC # BLD AUTO: 0 K/UL
NRBC BLD-RTO: 0 /100 WBC (ref 0–0.2)
PLATELET # BLD AUTO: 152 K/UL (ref 164–446)
PMV BLD AUTO: 13.2 FL (ref 9–12.9)
RBC # BLD AUTO: 4.49 M/UL (ref 4.2–5.4)
WBC # BLD AUTO: 8 K/UL (ref 4.8–10.8)

## 2024-09-10 PROCEDURE — 36415 COLL VENOUS BLD VENIPUNCTURE: CPT

## 2024-09-10 PROCEDURE — 85025 COMPLETE CBC W/AUTO DIFF WBC: CPT

## 2024-09-10 PROCEDURE — 82105 ALPHA-FETOPROTEIN SERUM: CPT

## 2024-09-13 LAB
# FETUSES US: NORMAL
AFP MOM SERPL: 0.76
AFP SERPL-MCNC: 24 NG/ML
AGE - REPORTED: 33.8 YR
CURRENT SMOKER: NO
FAMILY MEMBER DISEASES HX: NO
GA METHOD: NORMAL
GA: NORMAL WK
IDDM PATIENT QL: NO
INTEGRATED SCN PATIENT-IMP: NORMAL
SPECIMEN DRAWN SERPL: NORMAL

## 2024-10-01 ENCOUNTER — IMMUNIZATION (OUTPATIENT)
Dept: OCCUPATIONAL MEDICINE | Facility: CLINIC | Age: 34
End: 2024-10-01

## 2024-10-01 DIAGNOSIS — Z23 NEED FOR VACCINATION: Primary | ICD-10-CM

## 2024-10-05 PROCEDURE — 90656 IIV3 VACC NO PRSV 0.5 ML IM: CPT | Performed by: PREVENTIVE MEDICINE

## 2024-11-04 PROCEDURE — RXMED WILLOW AMBULATORY MEDICATION CHARGE: Performed by: INTERNAL MEDICINE

## 2024-11-05 ENCOUNTER — PHARMACY VISIT (OUTPATIENT)
Dept: PHARMACY | Facility: MEDICAL CENTER | Age: 34
End: 2024-11-05
Payer: COMMERCIAL

## 2024-11-14 ENCOUNTER — HOSPITAL ENCOUNTER (OUTPATIENT)
Facility: MEDICAL CENTER | Age: 34
End: 2024-11-14
Payer: COMMERCIAL

## 2024-11-18 ENCOUNTER — HOSPITAL ENCOUNTER (OUTPATIENT)
Dept: LAB | Facility: MEDICAL CENTER | Age: 34
End: 2024-11-18
Attending: OBSTETRICS & GYNECOLOGY
Payer: COMMERCIAL

## 2024-11-18 LAB
BASOPHILS # BLD AUTO: 0.5 % (ref 0–1.8)
BASOPHILS # BLD: 0.04 K/UL (ref 0–0.12)
EOSINOPHIL # BLD AUTO: 0.09 K/UL (ref 0–0.51)
EOSINOPHIL NFR BLD: 1.2 % (ref 0–6.9)
ERYTHROCYTE [DISTWIDTH] IN BLOOD BY AUTOMATED COUNT: 47.1 FL (ref 35.9–50)
GLUCOSE 1H P 50 G GLC PO SERPL-MCNC: 91 MG/DL (ref 70–139)
HCT VFR BLD AUTO: 32.5 % (ref 37–47)
HGB BLD-MCNC: 10.4 G/DL (ref 12–16)
IMM GRANULOCYTES # BLD AUTO: 0.03 K/UL (ref 0–0.11)
IMM GRANULOCYTES NFR BLD AUTO: 0.4 % (ref 0–0.9)
LYMPHOCYTES # BLD AUTO: 1.23 K/UL (ref 1–4.8)
LYMPHOCYTES NFR BLD: 16.4 % (ref 22–41)
MCH RBC QN AUTO: 26.4 PG (ref 27–33)
MCHC RBC AUTO-ENTMCNC: 32 G/DL (ref 32.2–35.5)
MCV RBC AUTO: 82.5 FL (ref 81.4–97.8)
MONOCYTES # BLD AUTO: 0.44 K/UL (ref 0–0.85)
MONOCYTES NFR BLD AUTO: 5.9 % (ref 0–13.4)
NEUTROPHILS # BLD AUTO: 5.66 K/UL (ref 1.82–7.42)
NEUTROPHILS NFR BLD: 75.6 % (ref 44–72)
NRBC # BLD AUTO: 0 K/UL
NRBC BLD-RTO: 0 /100 WBC (ref 0–0.2)
PLATELET # BLD AUTO: 155 K/UL (ref 164–446)
PMV BLD AUTO: 13.9 FL (ref 9–12.9)
RBC # BLD AUTO: 3.94 M/UL (ref 4.2–5.4)
T PALLIDUM AB SER QL IA: NONREACTIVE
WBC # BLD AUTO: 7.5 K/UL (ref 4.8–10.8)

## 2024-11-18 PROCEDURE — 87491 CHLMYD TRACH DNA AMP PROBE: CPT

## 2024-11-18 PROCEDURE — 86780 TREPONEMA PALLIDUM: CPT

## 2024-11-18 PROCEDURE — 85025 COMPLETE CBC W/AUTO DIFF WBC: CPT

## 2024-11-18 PROCEDURE — 36415 COLL VENOUS BLD VENIPUNCTURE: CPT

## 2024-11-18 PROCEDURE — 82950 GLUCOSE TEST: CPT

## 2024-11-18 PROCEDURE — 87591 N.GONORRHOEAE DNA AMP PROB: CPT

## 2025-01-07 ENCOUNTER — PHARMACY VISIT (OUTPATIENT)
Dept: PHARMACY | Facility: MEDICAL CENTER | Age: 35
End: 2025-01-07
Payer: COMMERCIAL

## 2025-01-07 PROCEDURE — RXMED WILLOW AMBULATORY MEDICATION CHARGE: Performed by: INTERNAL MEDICINE

## 2025-01-07 RX ORDER — RESPIRATORY SYNCYTIAL VIRUS VACCINE 120MCG/0.5
0.5 KIT INTRAMUSCULAR
Qty: 0.5 ML | Refills: 0 | OUTPATIENT
Start: 2025-01-07

## 2025-01-07 RX ORDER — CLOSTRIDIUM TETANI TOXOID ANTIGEN (FORMALDEHYDE INACTIVATED), CORYNEBACTERIUM DIPHTHERIAE TOXOID ANTIGEN (FORMALDEHYDE INACTIVATED), BORDETELLA PERTUSSIS TOXOID ANTIGEN (GLUTARALDEHYDE INACTIVATED), BORDETELLA PERTUSSIS FILAMENTOUS HEMAGGLUTININ ANTIGEN (FORMALDEHYDE INACTIVATED), BORDETELLA PERTUSSIS PERTACTIN ANTIGEN, AND BORDETELLA PERTUSSIS FIMBRIAE 2/3 ANTIGEN 5; 2; 2.5; 5; 3; 5 [LF]/.5ML; [LF]/.5ML; UG/.5ML; UG/.5ML; UG/.5ML; UG/.5ML
0.5 INJECTION, SUSPENSION INTRAMUSCULAR
Qty: 0.5 ML | Refills: 0 | OUTPATIENT
Start: 2025-01-07

## 2025-01-23 ENCOUNTER — HOSPITAL ENCOUNTER (OUTPATIENT)
Facility: MEDICAL CENTER | Age: 35
End: 2025-01-23
Attending: OBSTETRICS & GYNECOLOGY
Payer: COMMERCIAL

## 2025-01-23 PROCEDURE — 87150 DNA/RNA AMPLIFIED PROBE: CPT

## 2025-01-23 PROCEDURE — 87081 CULTURE SCREEN ONLY: CPT

## 2025-01-25 LAB — GP B STREP DNA SPEC QL NAA+PROBE: POSITIVE

## 2025-05-03 ENCOUNTER — OFFICE VISIT (OUTPATIENT)
Dept: URGENT CARE | Facility: PHYSICIAN GROUP | Age: 35
End: 2025-05-03
Payer: COMMERCIAL

## 2025-05-03 VITALS
HEIGHT: 65 IN | HEART RATE: 78 BPM | TEMPERATURE: 98.3 F | DIASTOLIC BLOOD PRESSURE: 62 MMHG | OXYGEN SATURATION: 99 % | WEIGHT: 162.7 LBS | RESPIRATION RATE: 20 BRPM | BODY MASS INDEX: 27.11 KG/M2 | SYSTOLIC BLOOD PRESSURE: 112 MMHG

## 2025-05-03 DIAGNOSIS — R39.15 URINARY URGENCY: ICD-10-CM

## 2025-05-03 DIAGNOSIS — R39.9 UTI SYMPTOMS: ICD-10-CM

## 2025-05-03 DIAGNOSIS — R35.0 URINARY FREQUENCY: ICD-10-CM

## 2025-05-03 DIAGNOSIS — N30.01 ACUTE CYSTITIS WITH HEMATURIA: ICD-10-CM

## 2025-05-03 LAB
APPEARANCE UR: NORMAL
BILIRUB UR STRIP-MCNC: NORMAL MG/DL
COLOR UR AUTO: NORMAL
GLUCOSE UR STRIP.AUTO-MCNC: NORMAL MG/DL
KETONES UR STRIP.AUTO-MCNC: NORMAL MG/DL
LEUKOCYTE ESTERASE UR QL STRIP.AUTO: NORMAL
NITRITE UR QL STRIP.AUTO: NORMAL
PH UR STRIP.AUTO: 7 [PH] (ref 5–8)
PROT UR QL STRIP: NORMAL MG/DL
RBC UR QL AUTO: NORMAL
SP GR UR STRIP.AUTO: 1.02
UROBILINOGEN UR STRIP-MCNC: 2 MG/DL

## 2025-05-03 PROCEDURE — 1125F AMNT PAIN NOTED PAIN PRSNT: CPT | Performed by: NURSE PRACTITIONER

## 2025-05-03 PROCEDURE — 81002 URINALYSIS NONAUTO W/O SCOPE: CPT | Performed by: NURSE PRACTITIONER

## 2025-05-03 PROCEDURE — 99214 OFFICE O/P EST MOD 30 MIN: CPT | Performed by: NURSE PRACTITIONER

## 2025-05-03 PROCEDURE — 3074F SYST BP LT 130 MM HG: CPT | Performed by: NURSE PRACTITIONER

## 2025-05-03 PROCEDURE — 3078F DIAST BP <80 MM HG: CPT | Performed by: NURSE PRACTITIONER

## 2025-05-03 RX ORDER — NORETHINDRONE 0.35 MG/1
1 TABLET ORAL DAILY
COMMUNITY
Start: 2025-03-27 | End: 2025-06-25

## 2025-05-03 RX ORDER — NITROFURANTOIN 25; 75 MG/1; MG/1
100 CAPSULE ORAL EVERY 12 HOURS
Qty: 10 CAPSULE | Refills: 0 | Status: SHIPPED | OUTPATIENT
Start: 2025-05-03 | End: 2025-05-08

## 2025-05-03 RX ORDER — DULOXETIN HYDROCHLORIDE 20 MG/1
20 CAPSULE, DELAYED RELEASE ORAL
COMMUNITY
Start: 2025-04-21

## 2025-05-03 ASSESSMENT — PAIN SCALES - GENERAL: PAINLEVEL_OUTOF10: 5=MODERATE PAIN

## 2025-05-03 ASSESSMENT — FIBROSIS 4 INDEX: FIB4 SCORE: 1.16

## 2025-06-01 ENCOUNTER — OFFICE VISIT (OUTPATIENT)
Dept: URGENT CARE | Facility: CLINIC | Age: 35
End: 2025-06-01
Payer: COMMERCIAL

## 2025-06-01 ENCOUNTER — HOSPITAL ENCOUNTER (OUTPATIENT)
Facility: MEDICAL CENTER | Age: 35
End: 2025-06-01
Payer: COMMERCIAL

## 2025-06-01 ENCOUNTER — PHARMACY VISIT (OUTPATIENT)
Dept: PHARMACY | Facility: MEDICAL CENTER | Age: 35
End: 2025-06-01
Payer: COMMERCIAL

## 2025-06-01 VITALS
OXYGEN SATURATION: 98 % | HEART RATE: 81 BPM | SYSTOLIC BLOOD PRESSURE: 104 MMHG | BODY MASS INDEX: 27.11 KG/M2 | WEIGHT: 162.7 LBS | RESPIRATION RATE: 16 BRPM | HEIGHT: 65 IN | DIASTOLIC BLOOD PRESSURE: 64 MMHG | TEMPERATURE: 98.1 F

## 2025-06-01 DIAGNOSIS — R39.9 UTI SYMPTOMS: Primary | ICD-10-CM

## 2025-06-01 DIAGNOSIS — N30.01 ACUTE CYSTITIS WITH HEMATURIA: ICD-10-CM

## 2025-06-01 LAB
APPEARANCE UR: NORMAL
BILIRUB UR STRIP-MCNC: NEGATIVE MG/DL
COLOR UR AUTO: NORMAL
GLUCOSE UR STRIP.AUTO-MCNC: 100 MG/DL
KETONES UR STRIP.AUTO-MCNC: NEGATIVE MG/DL
LEUKOCYTE ESTERASE UR QL STRIP.AUTO: NEGATIVE
NITRITE UR QL STRIP.AUTO: POSITIVE
PH UR STRIP.AUTO: 5.5 [PH] (ref 5–8)
PROT UR QL STRIP: 30 MG/DL
RBC UR QL AUTO: NORMAL
SP GR UR STRIP.AUTO: 1.02
UROBILINOGEN UR STRIP-MCNC: 1 MG/DL

## 2025-06-01 PROCEDURE — 87077 CULTURE AEROBIC IDENTIFY: CPT

## 2025-06-01 PROCEDURE — 87660 TRICHOMONAS VAGIN DIR PROBE: CPT

## 2025-06-01 PROCEDURE — RXMED WILLOW AMBULATORY MEDICATION CHARGE

## 2025-06-01 PROCEDURE — 99214 OFFICE O/P EST MOD 30 MIN: CPT

## 2025-06-01 PROCEDURE — 3074F SYST BP LT 130 MM HG: CPT

## 2025-06-01 PROCEDURE — 87510 GARDNER VAG DNA DIR PROBE: CPT

## 2025-06-01 PROCEDURE — 87086 URINE CULTURE/COLONY COUNT: CPT

## 2025-06-01 PROCEDURE — 87186 SC STD MICRODIL/AGAR DIL: CPT

## 2025-06-01 PROCEDURE — 87480 CANDIDA DNA DIR PROBE: CPT

## 2025-06-01 PROCEDURE — 81002 URINALYSIS NONAUTO W/O SCOPE: CPT

## 2025-06-01 PROCEDURE — 3078F DIAST BP <80 MM HG: CPT

## 2025-06-01 RX ORDER — NITROFURANTOIN 25; 75 MG/1; MG/1
100 CAPSULE ORAL 2 TIMES DAILY
Qty: 10 CAPSULE | Refills: 0 | Status: SHIPPED | OUTPATIENT
Start: 2025-06-01 | End: 2025-06-06

## 2025-06-01 ASSESSMENT — FIBROSIS 4 INDEX: FIB4 SCORE: 1.16

## 2025-06-02 ENCOUNTER — RESULTS FOLLOW-UP (OUTPATIENT)
Dept: URGENT CARE | Facility: CLINIC | Age: 35
End: 2025-06-02
Payer: COMMERCIAL

## 2025-06-02 DIAGNOSIS — N30.01 ACUTE CYSTITIS WITH HEMATURIA: ICD-10-CM

## 2025-06-02 DIAGNOSIS — R39.9 UTI SYMPTOMS: ICD-10-CM

## 2025-06-02 DIAGNOSIS — N76.0 BV (BACTERIAL VAGINOSIS): Primary | ICD-10-CM

## 2025-06-02 DIAGNOSIS — B96.89 BV (BACTERIAL VAGINOSIS): Primary | ICD-10-CM

## 2025-06-02 LAB
CANDIDA DNA VAG QL PROBE+SIG AMP: NEGATIVE
G VAGINALIS DNA VAG QL PROBE+SIG AMP: POSITIVE
T VAGINALIS DNA VAG QL PROBE+SIG AMP: NEGATIVE

## 2025-06-02 RX ORDER — METRONIDAZOLE 500 MG/1
500 TABLET ORAL 2 TIMES DAILY
Qty: 14 TABLET | Refills: 0 | Status: SHIPPED | OUTPATIENT
Start: 2025-06-02 | End: 2025-06-09

## 2025-06-02 NOTE — PROGRESS NOTES
"Chief Complaint   Patient presents with    Dysuria     X3 days: Polyuria, dysuria         Subjective:   HISTORY OF PRESENT ILLNESS: Williams Singletary is a 35 y.o. female who presents for dysuria, frequency and urgency x 3 days. No vaginal discharge, itching or STD concerns at this time. Patient denies fevers, flank pain, pelvic pain, she is tolerating PO         Medications, Allergies, current problem list, Social and Family history reviewed today in Epic.     Objective:     /64   Pulse 81   Temp 36.7 °C (98.1 °F)   Resp 16   Ht 1.651 m (5' 5\")   Wt 73.8 kg (162 lb 11.2 oz)   SpO2 98%     Physical Exam  Vitals reviewed.   Constitutional:       General: She is not in acute distress.     Appearance: Normal appearance. She is not ill-appearing or toxic-appearing.   HENT:      Mouth/Throat:      Mouth: Mucous membranes are moist.   Cardiovascular:      Rate and Rhythm: Normal rate.   Pulmonary:      Effort: Pulmonary effort is normal.   Abdominal:      Palpations: Abdomen is soft.      Tenderness: There is abdominal tenderness in the suprapubic area. There is no right CVA tenderness, left CVA tenderness, guarding or rebound.   Genitourinary:     Comments: Deferred per pt  Skin:     General: Skin is warm and dry.      Capillary Refill: Capillary refill takes less than 2 seconds.   Neurological:      Mental Status: She is alert and oriented to person, place, and time.   Psychiatric:         Mood and Affect: Mood normal.          Assessment/Plan:     Diagnosis and associated orders    I personally reviewed prior external notes and test results pertinent to today's visit.     1. UTI symptoms  POCT Urinalysis    VAGINAL PATHOGENS DNA PANEL    URINE CULTURE(NEW)    nitrofurantoin (MACROBID) 100 MG Cap      2. Acute cystitis with hematuria  POCT Urinalysis    VAGINAL PATHOGENS DNA PANEL    URINE CULTURE(NEW)    nitrofurantoin (MACROBID) 100 MG Cap            IMPRESSION:  Pt presents with uti like symptoms .  The " patient is well appearing here with reassuring vitals signs.   Symptomatology is consistent with uti although she took AZO and the dip is unreliable.  She would like to begin antibiotics while we r/o other cuases . Advised to push fluids.  Discussed anticipated duration of illness, return to work/school, and indications to RTC or go to the Emergency department.    Patient states understanding of the plan of care and discharge instructions.  They are discharged in stable condition.         Please note that this dictation was created using voice recognition software. I have made a reasonable attempt to correct obvious errors, but I expect that there are errors of grammar and possibly content that I did not discover before finalizing the note.    This note was electronically signed by TAMAR Cabrera
